# Patient Record
Sex: MALE | Race: WHITE | NOT HISPANIC OR LATINO | Employment: FULL TIME | URBAN - METROPOLITAN AREA
[De-identification: names, ages, dates, MRNs, and addresses within clinical notes are randomized per-mention and may not be internally consistent; named-entity substitution may affect disease eponyms.]

---

## 2017-08-14 DIAGNOSIS — H10.31 ACUTE CONJUNCTIVITIS OF RIGHT EYE, UNSPECIFIED ACUTE CONJUNCTIVITIS TYPE: Primary | ICD-10-CM

## 2017-08-14 PROBLEM — E66.01 MORBID OBESITY: Status: ACTIVE | Noted: 2017-08-14

## 2017-08-14 PROBLEM — F51.05 INSOMNIA RELATED TO ANOTHER MENTAL DISORDER: Status: ACTIVE | Noted: 2017-08-14

## 2017-08-14 RX ORDER — NEOMYCIN/POLYMYXIN B/HYDROCORT 3.5-10K-1
SUSPENSION, DROPS(FINAL DOSAGE FORM)(ML) OPHTHALMIC (EYE)
Qty: 5 ML | Refills: 0 | Status: SHIPPED | OUTPATIENT
Start: 2017-08-14 | End: 2017-09-06

## 2017-09-06 RX ORDER — ALPRAZOLAM 1 MG/1
1 TABLET ORAL 2 TIMES DAILY
COMMUNITY
Start: 2017-03-14 | End: 2017-09-12 | Stop reason: SDUPTHER

## 2017-09-06 RX ORDER — ESCITALOPRAM OXALATE 20 MG/1
1 TABLET ORAL DAILY
COMMUNITY
Start: 2017-03-14 | End: 2017-09-12 | Stop reason: SDUPTHER

## 2017-09-12 ENCOUNTER — OFFICE VISIT (OUTPATIENT)
Dept: FAMILY MEDICINE | Facility: CLINIC | Age: 47
End: 2017-09-12
Payer: COMMERCIAL

## 2017-09-12 VITALS
BODY MASS INDEX: 40.43 KG/M2 | SYSTOLIC BLOOD PRESSURE: 121 MMHG | WEIGHT: 315 LBS | DIASTOLIC BLOOD PRESSURE: 84 MMHG | HEIGHT: 74 IN | HEART RATE: 72 BPM

## 2017-09-12 DIAGNOSIS — F41.1 GENERALIZED ANXIETY DISORDER: ICD-10-CM

## 2017-09-12 DIAGNOSIS — F33.2 ENDOGENOUS DEPRESSION: ICD-10-CM

## 2017-09-12 DIAGNOSIS — Z23 INFLUENZA VACCINE ADMINISTERED: Primary | ICD-10-CM

## 2017-09-12 PROCEDURE — 3008F BODY MASS INDEX DOCD: CPT | Mod: ,,, | Performed by: INTERNAL MEDICINE

## 2017-09-12 PROCEDURE — 90471 IMMUNIZATION ADMIN: CPT | Mod: ,,, | Performed by: INTERNAL MEDICINE

## 2017-09-12 PROCEDURE — 99213 OFFICE O/P EST LOW 20 MIN: CPT | Mod: 25,,, | Performed by: INTERNAL MEDICINE

## 2017-09-12 PROCEDURE — 90686 IIV4 VACC NO PRSV 0.5 ML IM: CPT | Mod: ,,, | Performed by: INTERNAL MEDICINE

## 2017-09-12 RX ORDER — ESCITALOPRAM OXALATE 20 MG/1
20 TABLET ORAL DAILY
Qty: 30 TABLET | Refills: 5 | Status: SHIPPED | OUTPATIENT
Start: 2017-09-12 | End: 2018-03-12

## 2017-09-12 RX ORDER — ALPRAZOLAM 1 MG/1
1 TABLET ORAL 2 TIMES DAILY
Qty: 60 TABLET | Refills: 5 | Status: SHIPPED | OUTPATIENT
Start: 2017-09-12 | End: 2018-03-12 | Stop reason: SDUPTHER

## 2017-09-12 NOTE — PATIENT INSTRUCTIONS
Anxiety Reaction  Anxiety is the feeling we all get when we think something bad might happen. It is a normal response to stress and usually causes only a mild reaction. When anxiety becomes more severe, it can interfere with daily life. In some cases, you may not even be aware of what it is youre anxious about. There may also be a genetic link or it may be a learned behavior in the home.  Both psychological and physical triggers cause stress reaction. It's often a response to fear or emotional stress, real or imagined. This stress may come from home, family, work, or social relationships.  During an anxiety reaction, you may feel:  · Helpless  · Nervous  · Depressed  · Irritable  Your body may show signs of anxiety in many ways. You may experience:  · Dry mouth  · Shakiness  · Dizziness  · Weakness  · Trouble breathing  · Breathing fast (hyperventilating)  · Chest pressure  · Sweating  · Headache  · Nausea  · Diarrhea  · Tiredness  · Inability to sleep  · Sexual problems  Home care  · Try to locate the sources of stress in your life. They may not be obvious. These may include:  ¨ Daily hassles of life (traffic jams, missed appointments, car troubles, etc.)  ¨ Major life changes, both good (new baby, job promotion) and bad (loss of job, loss of loved one)  ¨ Overload: feeling that you have too many responsibilities and can't take care of all of them at once  ¨ Feeling helpless, feeling that your problems are beyond what youre able to solve  · Notice how your body reacts to stress. Learn to listen to your body signals. This will help you take action before the stress becomes severe.  · When you can, do something about the source of your stress. (Avoid hassles, limit the amount of change that happens in your life at one time and take a break when you feel overloaded).  · Unfortunately, many stressful situations can't be avoided. It is necessary to learn how to better manage stress. There are many proven methods  that will reduce your anxiety. These include simple things like exercise, good nutrition and adequate rest. Also, there are certain techniques that are helpful:  ¨ Relaxation  ¨ Breathing exercises  ¨ Visualization  ¨ Biofeedback  ¨ Meditation  For more information about this, consult your doctor or go to a local bookstore and review the many books and tapes available on this subject.  Follow-up care  If you feel that your anxiety is not responding to self-help measures, contact your doctor or make an appointment with a counselor. You may need short-term psychological counseling and temporary medicine to help you manage stress.  Call 911  Call your healthcare provider right away if any of these occur:  · Trouble breathing  · Confusion  · Drowsiness or trouble wakening  · Fainting or loss of consciousness  · Rapid heart rate  · Seizure  · New chest pain that becomes more severe, lasts longer, or spreads into your shoulder, arm, neck, jaw, or back  When to seek medical advice  Call your healthcare provider right away if any of these occur:  · Your symptoms get worse  · Severe headache not relieved by rest and mild pain reliever  Date Last Reviewed: 9/29/2015  © 7991-5251 [employer]. [address] [place] [address]. All rights reserved. This information is not intended as a substitute for professional medical care. Always follow your healthcare professional's instructions.

## 2017-09-12 NOTE — PROGRESS NOTES
Subjective:       Patient ID: Arun Ellis is a 47 y.o. male.    Chief Complaint: Hypertension; Anxiety; and Depression    Subjective:  Arun Ellis is an 47 y.o. male who presents for evaluation and treatment of depressive symptoms.   Onset approximately 2 yrs ago, stable since that time.   Current symptoms include insomnia.   Current treatment for depression:Medication  Sleep problems: Moderate    Early awakening:Moderate    Energy: Fair  Motivation: Fair  Concentration: Fair  Rumination/worrying: Moderate  Memory: Good  Tearfulness: Absent   Anxiety: Marked   Panic: Moderate   Overall Mood: Moderately improved   Hopelessness: Mild  Suicidal ideation: Absent   Other/Psychosocial Stressors: Patient's son's illness and need for liver transplant. He had prolonged hospitalization and repeat hospitalization. Patient also had 3 job changes in the last 2 years.  Family history positive for depression in the patient's unknown.   Previous treatment modalities employed include Medication.   Past episodes of depression: Unknown  Organic causes of depression present: None.          Anxiety   Presents for follow-up visit. Symptoms include excessive worry, insomnia and nervous/anxious behavior. Patient reports no chest pain, confusion, dizziness, irritability, malaise, obsessions, palpitations, restlessness or shortness of breath. The severity of symptoms is interfering with daily activities and moderate. The quality of sleep is non-restorative.           Past Medical History:   Diagnosis Date    Anxiety     Depression      Social History     Social History    Marital status:      Spouse name: N/A    Number of children: N/A    Years of education: N/A     Occupational History    Not on file.     Social History Main Topics    Smoking status: Current Some Day Smoker     Types: Pipe, Cigars    Smokeless tobacco: Never Used    Alcohol use Yes    Drug use: No    Sexual activity: Yes     Partners: Female  "    Other Topics Concern    Not on file     Social History Narrative    No narrative on file     Past Surgical History:   Procedure Laterality Date    APPENDECTOMY       Family History   Problem Relation Age of Onset    Heart disease Father        Review of Systems   Constitutional: Negative for activity change, appetite change, fatigue, irritability and unexpected weight change.   HENT: Negative for congestion, sneezing and trouble swallowing.    Eyes: Negative for pain and visual disturbance.   Respiratory: Negative for cough, chest tightness and shortness of breath.    Cardiovascular: Negative for chest pain, palpitations and leg swelling.   Gastrointestinal: Negative for abdominal distention, abdominal pain, blood in stool, constipation and diarrhea.   Endocrine: Negative for cold intolerance, heat intolerance, polydipsia, polyphagia and polyuria.   Genitourinary: Negative for dysuria, hematuria and scrotal swelling.   Musculoskeletal: Negative for arthralgias, back pain and gait problem.   Skin: Negative for pallor, rash and wound.   Allergic/Immunologic: Negative for environmental allergies, food allergies and immunocompromised state.   Neurological: Negative for dizziness, seizures, speech difficulty, light-headedness and numbness.   Hematological: Negative for adenopathy. Does not bruise/bleed easily.   Psychiatric/Behavioral: Negative for agitation, behavioral problems and confusion. The patient is nervous/anxious and has insomnia.         Underlying long-standing anxiety and depression.       Objective:       Vitals:    09/12/17 1033   BP: 121/84   Pulse: 72   Weight: (!) 159.7 kg (352 lb)   Height: 6' 2" (1.88 m)     Physical Exam   Constitutional: He is oriented to person, place, and time. He appears well-developed.   Significant obesity with a BMI of 45.   HENT:   Head: Normocephalic and atraumatic.   Nose: Nose normal.   Mouth/Throat: Oropharynx is clear and moist. No oropharyngeal exudate.   Eyes: " Conjunctivae and EOM are normal.   Neck: Normal range of motion. Neck supple. No JVD present. No tracheal deviation present. No thyromegaly present.   Cardiovascular: Normal rate, regular rhythm and normal heart sounds.  Exam reveals no gallop and no friction rub.    No murmur heard.  Pulmonary/Chest: Effort normal and breath sounds normal. No respiratory distress. He has no wheezes. He has no rales.   Abdominal: Soft. Bowel sounds are normal. He exhibits no distension. There is no tenderness.   Musculoskeletal: Normal range of motion.   Neurological: He is alert and oriented to person, place, and time. He has normal reflexes.   Skin: Skin is warm and dry.   Psychiatric: His behavior is normal. Thought content normal. His mood appears anxious. Depressed: somewhat anhedonic.   Nursing note and vitals reviewed.      Assessment:       1. Influenza vaccine administered    2. Endogenous depression    3. Generalized anxiety disorder         Plan:           Influenza vaccine administered  -     Influenza - Quadrivalent (3 years & older) (PF)    Endogenous depression    Generalized anxiety disorder    Patient's underlying depression and anxiety has been reviewed again. He has gone through a somewhat tumultuous face in his life with his sons serious illness and liver transplant. On a general he is unable to handle stress and anxiety at any level.    He has 3 job changes in the last couple of years.    He feels that his current job might be more stable and less aggravating or anxiety. His son who had a liver transplant as this seems to be coming back to the mainstream with a reasonable health.    Although he has been on benzodiazepines for several months and I suspect now there will be issues of long-term dependency. I'm hoping that with self introspection and realizes patient and also attempting techniques of medication/yoga he is able to wean himself off the medications. This will be a challenge however.     No suicidal  ideations.

## 2018-03-12 ENCOUNTER — OFFICE VISIT (OUTPATIENT)
Dept: FAMILY MEDICINE | Facility: CLINIC | Age: 48
End: 2018-03-12
Payer: COMMERCIAL

## 2018-03-12 VITALS
BODY MASS INDEX: 40.43 KG/M2 | DIASTOLIC BLOOD PRESSURE: 81 MMHG | HEART RATE: 71 BPM | HEIGHT: 74 IN | SYSTOLIC BLOOD PRESSURE: 120 MMHG | WEIGHT: 315 LBS

## 2018-03-12 DIAGNOSIS — F41.1 GENERALIZED ANXIETY DISORDER: Primary | ICD-10-CM

## 2018-03-12 DIAGNOSIS — Z13.220 SCREENING FOR LIPID DISORDERS: ICD-10-CM

## 2018-03-12 DIAGNOSIS — Z71.85 IMMUNIZATION COUNSELING: ICD-10-CM

## 2018-03-12 PROBLEM — Z78.9 NON-SMOKER: Status: ACTIVE | Noted: 2018-03-12

## 2018-03-12 PROCEDURE — 90471 IMMUNIZATION ADMIN: CPT | Mod: ,,, | Performed by: INTERNAL MEDICINE

## 2018-03-12 PROCEDURE — 90715 TDAP VACCINE 7 YRS/> IM: CPT | Mod: ,,, | Performed by: INTERNAL MEDICINE

## 2018-03-12 PROCEDURE — 99213 OFFICE O/P EST LOW 20 MIN: CPT | Mod: 25,,, | Performed by: INTERNAL MEDICINE

## 2018-03-12 RX ORDER — ALPRAZOLAM 1 MG/1
TABLET ORAL
Qty: 45 TABLET | Refills: 5 | Status: SHIPPED | OUTPATIENT
Start: 2018-03-12 | End: 2018-09-17 | Stop reason: SDUPTHER

## 2018-03-12 NOTE — PATIENT INSTRUCTIONS
Anxiety Reaction  Anxiety is the feeling we all get when we think something bad might happen. It is a normal response to stress and usually causes only a mild reaction. When anxiety becomes more severe, it can interfere with daily life. In some cases, you may not even be aware of what it is youre anxious about. There may also be a genetic link or it may be a learned behavior in the home.  Both psychological and physical triggers cause stress reaction. It's often a response to fear or emotional stress, real or imagined. This stress may come from home, family, work, or social relationships.  During an anxiety reaction, you may feel:  · Helpless  · Nervous  · Depressed  · Irritable  Your body may show signs of anxiety in many ways. You may experience:  · Dry mouth  · Shakiness  · Dizziness  · Weakness  · Trouble breathing  · Breathing fast (hyperventilating)  · Chest pressure  · Sweating  · Headache  · Nausea  · Diarrhea  · Tiredness  · Inability to sleep  · Sexual problems  Home care  · Try to locate the sources of stress in your life. They may not be obvious. These may include:  ¨ Daily hassles of life (traffic jams, missed appointments, car troubles, etc.)  ¨ Major life changes, both good (new baby, job promotion) and bad (loss of job, loss of loved one)  ¨ Overload: feeling that you have too many responsibilities and can't take care of all of them at once  ¨ Feeling helpless, feeling that your problems are beyond what youre able to solve  · Notice how your body reacts to stress. Learn to listen to your body signals. This will help you take action before the stress becomes severe.  · When you can, do something about the source of your stress. (Avoid hassles, limit the amount of change that happens in your life at one time and take a break when you feel overloaded).  · Unfortunately, many stressful situations can't be avoided. It is necessary to learn how to better manage stress. There are many proven methods  that will reduce your anxiety. These include simple things like exercise, good nutrition and adequate rest. Also, there are certain techniques that are helpful:  ¨ Relaxation  ¨ Breathing exercises  ¨ Visualization  ¨ Biofeedback  ¨ Meditation  For more information about this, consult your doctor or go to a local bookstore and review the many books and tapes available on this subject.  Follow-up care  If you feel that your anxiety is not responding to self-help measures, contact your doctor or make an appointment with a counselor. You may need short-term psychological counseling and temporary medicine to help you manage stress.  Call 911  Call your healthcare provider right away if any of these occur:  · Trouble breathing  · Confusion  · Drowsiness or trouble wakening  · Fainting or loss of consciousness  · Rapid heart rate  · Seizure  · New chest pain that becomes more severe, lasts longer, or spreads into your shoulder, arm, neck, jaw, or back  When to seek medical advice  Call your healthcare provider right away if any of these occur:  · Your symptoms get worse  · Severe headache not relieved by rest and mild pain reliever  Date Last Reviewed: 9/29/2015  © 7184-6648 [employer]. [address] [place] [address]. All rights reserved. This information is not intended as a substitute for professional medical care. Always follow your healthcare professional's instructions.        Your Bodys Response to Anxiety    Normal anxiety is part of the bodys natural defense system. It's an alert to a threat that is unknown, vague, or comes from your own internal fears. While youre in this state, your feelings can range from a vague sense of worry to physical sensations such as a pounding heartbeat. These feelings make you want to react to the threat. An anxiety response is normal in many situations. But when you have an anxiety disorder, the same response can occur at the wrong times.  Anxiety can be  "helpful  Normal anxiety is a signal from your brain that warns you of a threat and is a normal response to help you prevent something or decrease the bad effects of something you can't control. For example, anxiety is a normal response to situations that might damage your body, separate you from a loved one, or lose your job. The symptoms of anxiety can be physical and mental.  How does it feel?  At certain times, people with anxiety may have:  · Dizziness  · Muscle tension or pain  · Restlessness  · Sleeplessness  · Trouble concentrating  · Racing heartbeat  · Fast breathing  · Shaking or trembling  · Stomachache  · Diarrhea  · Loss of energy  · Sweating  · Cold, clammy hands  · Chest pain  · Dry mouth  Anxiety can also be a problem  Anxiety can become a problem when it is hard to control, occurs for months, and interferes with important parts of your life. With an anxiety disorder, your body has the response described above, but in inappropriate ways. The response a person has depends on the anxiety disorder he or she has. With some disorders, the anxiety is way out of proportion to the threat that triggers it. With others, anxiety may occur even when there isnt a clear threat or trigger.  Who does it affect?  Some people are more prone to persistent anxiety than others. It tends to run in families, and it affects more younger people than older people, and more women than men. But no age, race, or gender is immune to anxiety problems.  Anxiety can be treated  The good news is that the anxiety thats disrupting your life can be treated. Check with your healthcare provider and rule out any physical problems that may be causing the anxiety symptoms. If an anxiety disorder is diagnosed seek mental healthcare. This is an illness and it can respond to treatment. Most types of anxiety disorders will respond to "talk therapy" and medicines. Working with your doctor or other healthcare provider, you can develop skills to " help you cope with anxiety. You can also gain the perspective you need to overcome your fears. Note: Good sources of support or guidance can be found at your local hospital, mental health clinic, or an employee assistance program.  How to cope with anxiety  If anxiety is wearing you down, here are some things you can do to cope:  · Keep in mind that you cant control everything about a situation. Change what you can and let the rest take its course.  · Exercise--its a great way to relieve tension and help your body feel relaxed.  · Avoid caffeine and nicotine, which can make anxiety symptoms worse.  · Fight the temptation to turn to alcohol or unprescribed drugs for relief. They only make things worse in the long run.  · Educate yourself about anxiety disorders. Keep track of helpful online resources and books you can use during stressful periods.  · Try stress management techniques such as meditation.  · Consider online or in-person support groups.   Date Last Reviewed: 1/1/2017  © 2183-3703 [employer]. [address] [place] [address]. All rights reserved. This information is not intended as a substitute for professional medical care. Always follow your healthcare professional's instructions.        Treating Anxiety Disorders with Medicine  An anxiety disorder can make you feel nervous or apprehensive, even without a clear reason. In people age 65 and older, generalized anxiety disorder is one of the most commonly diagnosed anxiety disorders. Many times it occurs with depression. Certain anxiety disorders can cause intense feelings of fear or panic. You may even have physical symptoms such as a racing heartbeat, sweating, or dizziness. If you have these feelings, you dont have to suffer anymore. Treatment to help you overcome your fears will likely include therapy (also called counseling). Medicine may also be prescribed to help control your symptoms.    Medicines  Certain medicines may  be prescribed to help control your symptoms. So you may feel less anxious. You may also feel able to move forward with therapy. At first, medicines and dosages may need to be adjusted to find what works best for you. Try to be patient. Tell your healthcare provider how a medicine makes you feel. This way, you can work together to find the treatment thats best for you. Keep in mind that medicines can have side effects. Talk with your provider about any side effects that are bothering you. Changing the dose or type of medicine may help. Dont stop taking medicine on your own. That can cause symptoms to come back.  · Anti-anxiety medicine. This medicine eases symptoms and helps you relax. Your healthcare provider will explain when and how to use it. It may be prescribed for use before situations that make you anxious. You may also be told to take medicine on a regular schedule. Anti-anxiety medicine may make you feel a little sleepy or out of it. Dont drive a car or operate machinery while on this medicine, until you know how it affects you.  Caution  Never use alcohol or other drugs with anti-anxiety medicines. This could result in loss of muscular control, sedation, coma, or death. Also, use only the amount of medicine prescribed for you. If you think you may have taken too much, get emergency care right away.   · Antidepressant medicine. This kind of medicine is often used to treat anxiety, even if you arent depressed. An antidepressant helps balance out brain chemicals. This helps keep anxiety under control. This medicine is taken on a schedule. It takes a few weeks to start working. If you dont notice a change at first, you may just need more time. But if you dont notice results after the first few weeks, tell your provider.  Keep taking medicines as prescribed  Never change your dosage, share or use another person's medicine, or stop taking your medicines without talking to your healthcare provider first.  Keep the following in mind:  · Some medicines must be taken on a schedule. Make this part of your daily routine. For instance, always take your pill before brushing your teeth. A pillbox can help you remember if youve taken your medicine each day.  · Medicines are often taken for 6 to 12 months. Your healthcare provider will then evaluate whether you need to stay on them. Many people who have also had therapy may no longer need medicine to manage anxiety.  · You may need to stop taking medicine slowly to give your body time to adjust. When its time to stop, your healthcare provider will tell you more. Remember: Never stop taking your medicine without talking to your provider first.  · If symptoms return, you may need to start taking medicines again. This isnt your fault. Its just the nature of your anxiety disorder.  Special concerns  · Side effects. Medicines may cause side effects. Ask your healthcare provider or pharmacist what you can expect. They may have ideas for avoiding some side effects.  · Sexual problems. Some antidepressants can affect your desire for sex or your ability to have an orgasm. A change in dosage or medicine often solves the problem. If you have a sexual side effect that concerns you, tell your healthcare provider.  · Addiction. If youve never had a problem with drugs or alcohol, you may not have a problem with medicines used to treat anxiety disorders. But always discuss the medicines with your healthcare provider before taking them. If you have a history of addiction, you may not be able to use certain medicines used to treat anxiety disorders.  · Medicine interactions. Always check with your pharmacist before using any over-the-counter medicines, including herbal supplements.   Date Last Reviewed: 5/1/2017  © 6279-2594 [employer]. [address] [place] [address], [place] [address]. All rights reserved. This information is not intended as a substitute for professional medical  care. Always follow your healthcare professional's instructions.

## 2018-03-12 NOTE — PROGRESS NOTES
Subjective:       Patient ID: Arun Ellis is a 47 y.o. male.    Chief Complaint: Anxiety and Depression    Mr. Arun Ellis is a pleasant 47-year-old  male who comes for follow-up. He has underlying anxiety for several years. Approximately 2 years back his son had developed an acute liver failure for which he had to go through a liver transplant. While the transplant was successfully, the aftermath of the transplantation and the process of going back-and-forth to the hospital had rendered his anxiety worse at that point.    Over the last 2 years since several months he is gradually recuperating and is stabilizing. He does take 1 Xanax a day and the other at discretionary basis depending upon the stress.      Mr. Dias also had a couple of job changes in the interim. His mother-in-law was also ill.      Anxiety   Presents for follow-up visit. Symptoms include insomnia and nervous/anxious behavior. Patient reports no chest pain, compulsions, confusion, decreased concentration, depressed mood, dizziness, dry mouth, excessive worry, feeling of choking, hyperventilation, impotence, irritability, malaise, muscle tension, nausea, obsessions, palpitations, restlessness or shortness of breath. The quality of sleep is fair.           Past Medical History:   Diagnosis Date    Anxiety     Depression      Social History     Social History    Marital status:      Spouse name: N/A    Number of children: N/A    Years of education: N/A     Occupational History          [employer]     Social History Main Topics    Smoking status: Current Some Day Smoker     Types: Pipe, Cigars    Smokeless tobacco: Never Used    Alcohol use Yes    Drug use: No    Sexual activity: Yes     Partners: Female     Other Topics Concern    Not on file     Social History Narrative    No narrative on file     Past Surgical History:   Procedure Laterality Date    APPENDECTOMY       Family  "History   Problem Relation Age of Onset    Heart disease Father        Review of Systems   Constitutional: Negative for activity change, appetite change, fatigue, irritability and unexpected weight change.   HENT: Negative for congestion, sneezing and trouble swallowing.    Eyes: Negative for pain and visual disturbance.   Respiratory: Negative for cough, chest tightness and shortness of breath.         Patient's sleep apnea symptoms seem to be getting better better without mask.   Cardiovascular: Negative for chest pain, palpitations and leg swelling.   Gastrointestinal: Negative for abdominal distention, abdominal pain, blood in stool, constipation, diarrhea and nausea.   Endocrine: Negative for cold intolerance, heat intolerance, polydipsia, polyphagia and polyuria.   Genitourinary: Negative for dysuria, hematuria and impotence.   Musculoskeletal: Negative for back pain.   Skin: Negative for pallor, rash and wound.   Allergic/Immunologic: Negative for environmental allergies, food allergies and immunocompromised state.   Neurological: Negative for dizziness, seizures, speech difficulty, light-headedness and numbness.   Hematological: Negative for adenopathy. Does not bruise/bleed easily.   Psychiatric/Behavioral: Negative for agitation, behavioral problems, confusion and decreased concentration. The patient is nervous/anxious and has insomnia.         Underlying long-standing anxiety and depression.       Objective:       Vitals:    03/12/18 1558   BP: 120/81   Pulse: 71   Weight: (!) 156 kg (344 lb)   Height: 6' 2" (1.88 m)     Physical Exam   Constitutional: He is oriented to person, place, and time. He appears well-developed.   Significant obesity with a BMI of 45.   HENT:   Head: Normocephalic and atraumatic.   Nose: Nose normal.   Mouth/Throat: Oropharynx is clear and moist. No oropharyngeal exudate.   Eyes: Conjunctivae and EOM are normal.   Neck: Normal range of motion. Neck supple. No JVD present. No " tracheal deviation present. No thyromegaly present.   Cardiovascular: Normal rate, regular rhythm and normal heart sounds.  Exam reveals no gallop and no friction rub.    No murmur heard.  Pulmonary/Chest: Effort normal and breath sounds normal. No respiratory distress. He has no wheezes. He has no rales.   Abdominal: Soft. Bowel sounds are normal. He exhibits no distension. There is no tenderness.   Musculoskeletal: Normal range of motion.   Neurological: He is alert and oriented to person, place, and time. He has normal reflexes.   Skin: Skin is warm and dry.   Psychiatric: His behavior is normal. Thought content normal. His mood appears anxious. Depressed: somewhat anhedonic.   Nursing note and vitals reviewed.      Assessment:       1. Generalized anxiety disorder    2. Immunization counseling    3. Screening for lipid disorders         Plan:           Generalized anxiety disorder  -     ALPRAZolam (XANAX) 1 MG tablet; Usually no more than once a day and rarely twice a day as needed.  Dispense: 45 tablet; Refill: 5    Immunization counseling  -     (In Office Administered) Tdap Vaccine    Screening for lipid disorders  -     Lipid panel; Future; Expected date: 03/12/2018    He is also due for lipid panel checkup. He will also be due for DTaP vaccination.    All said and done, I will see Mr. Ellis back in 6 months time for annual physical.    As for his anxiety is concerned, hopefully with his sons gradual improvement and with streamlining and stabilizing of his general life-he will be better able to cope up with usual anxieties. Also advised him to consider breathing and meditation exercises whenever he feels stressed which will help him cope up with stressful situations.

## 2018-09-17 ENCOUNTER — OFFICE VISIT (OUTPATIENT)
Dept: FAMILY MEDICINE | Facility: CLINIC | Age: 48
End: 2018-09-17
Payer: COMMERCIAL

## 2018-09-17 VITALS
SYSTOLIC BLOOD PRESSURE: 132 MMHG | HEART RATE: 69 BPM | HEIGHT: 74 IN | BODY MASS INDEX: 40.43 KG/M2 | WEIGHT: 315 LBS | DIASTOLIC BLOOD PRESSURE: 84 MMHG

## 2018-09-17 DIAGNOSIS — F41.1 GENERALIZED ANXIETY DISORDER: ICD-10-CM

## 2018-09-17 DIAGNOSIS — Z23 INFLUENZA VACCINE ADMINISTERED: Primary | ICD-10-CM

## 2018-09-17 DIAGNOSIS — Z12.11 SCREENING FOR COLON CANCER: ICD-10-CM

## 2018-09-17 DIAGNOSIS — Z13.220 SCREENING FOR HYPERLIPIDEMIA: ICD-10-CM

## 2018-09-17 PROCEDURE — 99213 OFFICE O/P EST LOW 20 MIN: CPT | Mod: 25,,, | Performed by: INTERNAL MEDICINE

## 2018-09-17 PROCEDURE — 90471 IMMUNIZATION ADMIN: CPT | Mod: ,,, | Performed by: INTERNAL MEDICINE

## 2018-09-17 PROCEDURE — 90686 IIV4 VACC NO PRSV 0.5 ML IM: CPT | Mod: ,,, | Performed by: INTERNAL MEDICINE

## 2018-09-17 PROCEDURE — 3008F BODY MASS INDEX DOCD: CPT | Mod: ,,, | Performed by: INTERNAL MEDICINE

## 2018-09-17 RX ORDER — ALPRAZOLAM 1 MG/1
TABLET ORAL
Qty: 45 TABLET | Refills: 5 | Status: SHIPPED | OUTPATIENT
Start: 2018-09-17 | End: 2019-03-18 | Stop reason: SDUPTHER

## 2018-09-17 NOTE — PROGRESS NOTES
Subjective:       Patient ID: Arun Ellis is a 48 y.o. male.    Chief Complaint: Depression (refills ) and Anxiety    Depression Patient presents with the following symptoms: nervousness/anxiety.  Patient is not experiencing: confusion, decreased concentration, palpitations and shortness of breath.    Anxiety   Symptoms include nervous/anxious behavior. Patient reports no chest pain, confusion, decreased concentration, dizziness, nausea, palpitations or shortness of breath.       Patient has a long-standing history of anxiety. His coping skills are not very effective. Any change in his job situation, home situation aggravates his anxiety.    His anxiety seems to have got worse when his son who is age 6 years, critically ill and needed a liver transplant. At that time he was treated with lorazepam and Lexapro. Lexapro did not seem to help him too much.    At this point his son is doing fairly well after liver transplant and is beginning to reconsult with a stable job and stability at home.    However his anxiety episodes continue.    Past Medical History:   Diagnosis Date    Anxiety     Depression      Social History     Socioeconomic History    Marital status:      Spouse name: Not on file    Number of children: Not on file    Years of education: Not on file    Highest education level: Not on file   Social Needs    Financial resource strain: Not on file    Food insecurity - worry: Not on file    Food insecurity - inability: Not on file    Transportation needs - medical: Not on file    Transportation needs - non-medical: Not on file   Occupational History    Occupation:      Comment: [employer]   Tobacco Use    Smoking status: Current Some Day Smoker     Types: Pipe, Cigars    Smokeless tobacco: Never Used   Substance and Sexual Activity    Alcohol use: Yes    Drug use: No    Sexual activity: Yes     Partners: Female   Other Topics Concern    Not on file  "  Social History Narrative    Not on file     Past Surgical History:   Procedure Laterality Date    APPENDECTOMY       Family History   Problem Relation Age of Onset    Heart disease Father        Review of Systems   Constitutional: Negative for activity change, appetite change, fatigue and unexpected weight change.   HENT: Negative for congestion, sneezing and trouble swallowing.    Eyes: Negative for pain and visual disturbance.   Respiratory: Negative for cough, chest tightness and shortness of breath.         Patient's sleep apnea symptoms seem to be getting better better without mask.   Cardiovascular: Negative for chest pain, palpitations and leg swelling.   Gastrointestinal: Negative for abdominal distention, abdominal pain, blood in stool, constipation, diarrhea and nausea.   Endocrine: Negative for cold intolerance, heat intolerance, polydipsia, polyphagia and polyuria.   Genitourinary: Negative for dysuria and hematuria.   Musculoskeletal: Negative for back pain.   Skin: Negative for pallor, rash and wound.   Allergic/Immunologic: Negative for environmental allergies, food allergies and immunocompromised state.   Neurological: Negative for dizziness, seizures, speech difficulty, light-headedness and numbness.   Hematological: Negative for adenopathy. Does not bruise/bleed easily.   Psychiatric/Behavioral: Positive for depression. Negative for agitation, behavioral problems, confusion and decreased concentration. The patient is nervous/anxious.         Underlying long-standing anxiety and depression.         Objective:      Blood pressure 132/84, pulse 69, height 6' 2" (1.88 m), weight (!) 155.6 kg (343 lb). Body mass index is 44.04 kg/m².  Physical Exam   Constitutional: He is oriented to person, place, and time. He appears well-developed.   Significant obesity with a BMI of 44   HENT:   Head: Normocephalic and atraumatic.   Nose: Nose normal.   Mouth/Throat: Oropharynx is clear and moist. No " oropharyngeal exudate.   Eyes: Conjunctivae and EOM are normal.   Neck: Normal range of motion. Neck supple. No JVD present. No tracheal deviation present. No thyromegaly present.   Cardiovascular: Normal rate, regular rhythm and normal heart sounds. Exam reveals no gallop and no friction rub.   No murmur heard.  Pulmonary/Chest: Effort normal and breath sounds normal. No respiratory distress. He has no wheezes. He has no rales.   Abdominal: Soft. Bowel sounds are normal. He exhibits no distension. There is no tenderness.   Neurological: He is alert and oriented to person, place, and time. He has normal reflexes.   Skin: Skin is warm and dry.   Psychiatric: His behavior is normal. Thought content normal. His mood appears anxious.   Nursing note and vitals reviewed.        Assessment:       1. Influenza vaccine administered    2. Generalized anxiety disorder    3. Screening for hyperlipidemia           No visits with results within 3 Month(s) from this visit.   Latest known visit with results is:   No results found for any previous visit.         Plan:           Influenza vaccine administered  -     Influenza - Quadrivalent (3 years & older) (PF)    Generalized anxiety disorder  -     ALPRAZolam (XANAX) 1 MG tablet; Usually no more than once a day and rarely twice a day as needed.  Dispense: 45 tablet; Refill: 5    Screening for hyperlipidemia  -     Lipid panel; Future; Expected date: 09/17/2018      New prescription for Xanax will be given. Patient has been advised to minimize use of this medication for long-term use. Medication, self realization and mindfulness will help mitigate the daily stresses of life.    Also incorporate walking, exercise.    Patient will be updated on influenza vaccination. He is long overdue for lipid panel screening.    Now that he has turned 48, preventive care issues like colonoscopy and prostate screening will be addressed subsequently.      Current Outpatient Medications:      ALPRAZolam (XANAX) 1 MG tablet, Usually no more than once a day and rarely twice a day as needed., Disp: 45 tablet, Rfl: 5

## 2018-09-17 NOTE — PATIENT INSTRUCTIONS
Anxiety Reaction  Anxiety is the feeling we all get when we think something bad might happen. It is a normal response to stress and usually causes only a mild reaction. When anxiety becomes more severe, it can interfere with daily life. In some cases, you may not even be aware of what it is youre anxious about. There may also be a genetic link or it may be a learned behavior in the home.  Both psychological and physical triggers cause stress reaction. It's often a response to fear or emotional stress, real or imagined. This stress may come from home, family, work, or social relationships.  During an anxiety reaction, you may feel:  · Helpless  · Nervous  · Depressed  · Irritable  Your body may show signs of anxiety in many ways. You may experience:  · Dry mouth  · Shakiness  · Dizziness  · Weakness  · Trouble breathing  · Breathing fast (hyperventilating)  · Chest pressure  · Sweating  · Headache  · Nausea  · Diarrhea  · Tiredness  · Inability to sleep  · Sexual problems  Home care  · Try to locate the sources of stress in your life. They may not be obvious. These may include:  ¨ Daily hassles of life (traffic jams, missed appointments, car troubles, etc.)  ¨ Major life changes, both good (new baby, job promotion) and bad (loss of job, loss of loved one)  ¨ Overload: feeling that you have too many responsibilities and can't take care of all of them at once  ¨ Feeling helpless, feeling that your problems are beyond what youre able to solve  · Notice how your body reacts to stress. Learn to listen to your body signals. This will help you take action before the stress becomes severe.  · When you can, do something about the source of your stress. (Avoid hassles, limit the amount of change that happens in your life at one time and take a break when you feel overloaded).  · Unfortunately, many stressful situations can't be avoided. It is necessary to learn how to better manage stress. There are many proven methods  that will reduce your anxiety. These include simple things like exercise, good nutrition and adequate rest. Also, there are certain techniques that are helpful:  ¨ Relaxation  ¨ Breathing exercises  ¨ Visualization  ¨ Biofeedback  ¨ Meditation  For more information about this, consult your doctor or go to a local bookstore and review the many books and tapes available on this subject.  Follow-up care  If you feel that your anxiety is not responding to self-help measures, contact your doctor or make an appointment with a counselor. You may need short-term psychological counseling and temporary medicine to help you manage stress.  Call 911  Call your healthcare provider right away if any of these occur:  · Trouble breathing  · Confusion  · Drowsiness or trouble wakening  · Fainting or loss of consciousness  · Rapid heart rate  · Seizure  · New chest pain that becomes more severe, lasts longer, or spreads into your shoulder, arm, neck, jaw, or back  When to seek medical advice  Call your healthcare provider right away if any of these occur:  · Your symptoms get worse  · Severe headache not relieved by rest and mild pain reliever  Date Last Reviewed: 9/29/2015  © 4854-8015 [employer]. [address] [place] [address]. All rights reserved. This information is not intended as a substitute for professional medical care. Always follow your healthcare professional's instructions.        Your Bodys Response to Anxiety    Normal anxiety is part of the bodys natural defense system. It's an alert to a threat that is unknown, vague, or comes from your own internal fears. While youre in this state, your feelings can range from a vague sense of worry to physical sensations such as a pounding heartbeat. These feelings make you want to react to the threat. An anxiety response is normal in many situations. But when you have an anxiety disorder, the same response can occur at the wrong times.  Anxiety can be  "helpful  Normal anxiety is a signal from your brain that warns you of a threat and is a normal response to help you prevent something or decrease the bad effects of something you can't control. For example, anxiety is a normal response to situations that might damage your body, separate you from a loved one, or lose your job. The symptoms of anxiety can be physical and mental.  How does it feel?  At certain times, people with anxiety may have:  · Dizziness  · Muscle tension or pain  · Restlessness  · Sleeplessness  · Trouble concentrating  · Racing heartbeat  · Fast breathing  · Shaking or trembling  · Stomachache  · Diarrhea  · Loss of energy  · Sweating  · Cold, clammy hands  · Chest pain  · Dry mouth  Anxiety can also be a problem  Anxiety can become a problem when it is hard to control, occurs for months, and interferes with important parts of your life. With an anxiety disorder, your body has the response described above, but in inappropriate ways. The response a person has depends on the anxiety disorder he or she has. With some disorders, the anxiety is way out of proportion to the threat that triggers it. With others, anxiety may occur even when there isnt a clear threat or trigger.  Who does it affect?  Some people are more prone to persistent anxiety than others. It tends to run in families, and it affects more younger people than older people, and more women than men. But no age, race, or gender is immune to anxiety problems.  Anxiety can be treated  The good news is that the anxiety thats disrupting your life can be treated. Check with your healthcare provider and rule out any physical problems that may be causing the anxiety symptoms. If an anxiety disorder is diagnosed seek mental healthcare. This is an illness and it can respond to treatment. Most types of anxiety disorders will respond to "talk therapy" and medicines. Working with your doctor or other healthcare provider, you can develop skills to " help you cope with anxiety. You can also gain the perspective you need to overcome your fears. Note: Good sources of support or guidance can be found at your local hospital, mental health clinic, or an employee assistance program.  How to cope with anxiety  If anxiety is wearing you down, here are some things you can do to cope:  · Keep in mind that you cant control everything about a situation. Change what you can and let the rest take its course.  · Exercise--its a great way to relieve tension and help your body feel relaxed.  · Avoid caffeine and nicotine, which can make anxiety symptoms worse.  · Fight the temptation to turn to alcohol or unprescribed drugs for relief. They only make things worse in the long run.  · Educate yourself about anxiety disorders. Keep track of helpful online resources and books you can use during stressful periods.  · Try stress management techniques such as meditation.  · Consider online or in-person support groups.   Date Last Reviewed: 1/1/2017  © 4714-0230 [employer]. [address] [place] [address]. All rights reserved. This information is not intended as a substitute for professional medical care. Always follow your healthcare professional's instructions.        Understanding Generalized Anxiety Disorder (RIAN)  Anxiety can fill you with worry and fear. Sometimes anxiety is healthy. It alerts you to a potential threat and gets you to respond and take action. But for some people, anxiety gets so bad it causes problems in daily life. If you find yourself in a constant state of anxiety, you may have an anxiety disorder called generalized anxiety disorder (RIAN). Speak with your healthcare provider or mental health professional to learn more. He or she can help.     What is generalized anxiety disorder?  With RIAN, you might worry about money, your family and friends, work, or the world in general. You might not even be sure what you're anxious about. But  whatever it is, you have an intense fear that the worst will happen. These feelings never really go away. In people age 65 and older, RIAN is one of the most commonly diagnosed anxiety disorders.  Many times it occurs with depression. This constant worry affects your quality of life and makes it hard to function. RIAN can cause physical symptoms, too.  What are common symptoms of generalized anxiety disorder?  People with RIAN often think they have a physical illness. The disorder can cause symptoms, such as:  · Muscle tension, especially in the neck and shoulders  · Nausea and stomach problems  · Frequent headaches  · Feeling lightheaded  · Restlessness, trouble sleeping  · Feeling irritable and on edge all the time  How can generalized anxiety disorder be treated?  RIAN can be treated with medicine or therapy (also called counseling), or both. Medicine helps to reduce symptoms, so you can continue with your daily routine. Therapy helps you understand the cause of your anxiety and learn how to manage it. Both forms of treatment help you deal with problems that anxiety causes in your life. This helps you to be healthier and happier.  Date Last Reviewed: 5/1/2017 © 2000-2017 [employer]. [address] [place] [address], [place] [address]. All rights reserved. This information is not intended as a substitute for professional medical care. Always follow your healthcare professional's instructions.

## 2018-12-17 PROBLEM — Z13.220 SCREENING FOR HYPERLIPIDEMIA: Status: RESOLVED | Noted: 2018-09-17 | Resolved: 2018-12-17

## 2019-02-25 ENCOUNTER — OFFICE VISIT (OUTPATIENT)
Dept: FAMILY MEDICINE | Facility: CLINIC | Age: 49
End: 2019-02-25
Payer: COMMERCIAL

## 2019-02-25 ENCOUNTER — PATIENT MESSAGE (OUTPATIENT)
Dept: FAMILY MEDICINE | Facility: CLINIC | Age: 49
End: 2019-02-25

## 2019-02-25 VITALS
TEMPERATURE: 99 F | HEIGHT: 74 IN | SYSTOLIC BLOOD PRESSURE: 132 MMHG | HEART RATE: 70 BPM | DIASTOLIC BLOOD PRESSURE: 82 MMHG | BODY MASS INDEX: 40.43 KG/M2 | WEIGHT: 315 LBS

## 2019-02-25 DIAGNOSIS — B30.9 VIRAL CONJUNCTIVITIS OF BOTH EYES: Primary | ICD-10-CM

## 2019-02-25 PROCEDURE — 99212 OFFICE O/P EST SF 10 MIN: CPT | Mod: ,,, | Performed by: INTERNAL MEDICINE

## 2019-02-25 PROCEDURE — 99212 PR OFFICE/OUTPT VISIT, EST, LEVL II, 10-19 MIN: ICD-10-PCS | Mod: ,,, | Performed by: INTERNAL MEDICINE

## 2019-02-25 PROCEDURE — 3008F BODY MASS INDEX DOCD: CPT | Mod: ,,, | Performed by: INTERNAL MEDICINE

## 2019-02-25 PROCEDURE — 3008F PR BODY MASS INDEX (BMI) DOCUMENTED: ICD-10-PCS | Mod: ,,, | Performed by: INTERNAL MEDICINE

## 2019-02-25 RX ORDER — SULFACETAMIDE SODIUM 100 MG/ML
1 SOLUTION/ DROPS OPHTHALMIC 4 TIMES DAILY
Qty: 5 ML | Refills: 0 | Status: SHIPPED | OUTPATIENT
Start: 2019-02-25 | End: 2019-05-20

## 2019-02-25 NOTE — PROGRESS NOTES
Subjective:       Patient ID: Arun Ellis is a 48 y.o. male.    Chief Complaint: Conjunctivitis and Headache    Conjunctivitis   This is a new problem. The current episode started in the past 7 days. The problem occurs constantly. The problem has been unchanged. Associated symptoms include headaches. Pertinent negatives include no abdominal pain, chest pain, congestion, coughing, fatigue, nausea, numbness or rash.   Headache    This is a new problem. The current episode started in the past 7 days. Associated symptoms include eye redness. Pertinent negatives include no abdominal pain, back pain, coughing, dizziness, eye pain, nausea, numbness or seizures. He has tried acetaminophen for the symptoms. The treatment provided mild relief.       Past Medical History:   Diagnosis Date    Anxiety     Depression      Social History     Socioeconomic History    Marital status:      Spouse name: Nila Whitman     Number of children: 2    Years of education: Not on file    Highest education level: Not on file   Social Needs    Financial resource strain: Not on file    Food insecurity - worry: Not on file    Food insecurity - inability: Not on file    Transportation needs - medical: Not on file    Transportation needs - non-medical: Not on file   Occupational History    Occupation:      Comment: [employer]   Tobacco Use    Smoking status: Never Smoker    Smokeless tobacco: Never Used   Substance and Sexual Activity    Alcohol use: Yes    Drug use: No    Sexual activity: Yes     Partners: Female   Other Topics Concern    Not on file   Social History Narrative    Not on file     Past Surgical History:   Procedure Laterality Date    APPENDECTOMY       Family History   Problem Relation Age of Onset    Heart disease Father        Review of Systems   Constitutional: Negative for activity change, appetite change, fatigue and unexpected weight change.   HENT: Negative  "for congestion, sneezing and trouble swallowing.    Eyes: Positive for redness. Negative for pain and visual disturbance.   Respiratory: Negative for cough, chest tightness and shortness of breath.         Patient's sleep apnea symptoms seem to be getting better better without mask.   Cardiovascular: Negative for chest pain, palpitations and leg swelling.   Gastrointestinal: Negative for abdominal distention, abdominal pain, blood in stool, constipation, diarrhea and nausea.   Endocrine: Negative for cold intolerance, heat intolerance, polydipsia, polyphagia and polyuria.   Genitourinary: Negative for dysuria and hematuria.   Musculoskeletal: Negative for back pain.   Skin: Negative for pallor, rash and wound.   Allergic/Immunologic: Negative for environmental allergies, food allergies and immunocompromised state.   Neurological: Positive for headaches. Negative for dizziness, seizures, speech difficulty, light-headedness and numbness.   Hematological: Negative for adenopathy. Does not bruise/bleed easily.   Psychiatric/Behavioral: Negative for agitation, behavioral problems, confusion and decreased concentration. The patient is nervous/anxious.         Underlying long-standing anxiety and depression.         Objective:      Blood pressure 132/82, pulse 70, temperature 98.6 °F (37 °C), height 6' 2" (1.88 m), weight (!) 156.9 kg (346 lb). Body mass index is 44.42 kg/m².  Physical Exam   Constitutional: He is oriented to person, place, and time. He appears well-developed.   Significant obesity with a BMI of 44   HENT:   Head: Normocephalic and atraumatic.   Nose: Nose normal.   Mouth/Throat: Oropharynx is clear and moist. No oropharyngeal exudate.   Eyes: Conjunctivae and EOM are normal.   Neck: Normal range of motion. Neck supple. No JVD present. No tracheal deviation present. No thyromegaly present.   Cardiovascular: Normal rate, regular rhythm and normal heart sounds. Exam reveals no gallop and no friction rub. "   No murmur heard.  Pulmonary/Chest: Effort normal and breath sounds normal. No respiratory distress. He has no wheezes. He has no rales.   Abdominal: Soft. Bowel sounds are normal. He exhibits no distension. There is no tenderness.   Neurological: He is alert and oriented to person, place, and time. He has normal reflexes.   Psychiatric: His behavior is normal. Thought content normal. His mood appears anxious.   Nursing note and vitals reviewed.        Assessment:       1. Viral conjunctivitis of both eyes           No visits with results within 3 Month(s) from this visit.   Latest known visit with results is:   No results found for any previous visit.         Plan:           Viral conjunctivitis of both eyes  -     sulfacetamide sodium 10% (BLEPH-10) 10 % ophthalmic solution; Place 1 drop into both eyes 4 (four) times daily.  Dispense: 5 mL; Refill: 0      Precautions off exposure and contact have been discussed. Keep eyes with warm water twice a day.    Sulfacetamide for secondary prevention of bacterial infection. Tylenol or Motrin for headaches. Headaches are probably related to viral infection. No fevers.    Let us know by Wednesday if eyes no better in which case we'll consider ophthalmology evaluation.    Keep  regular appointment as per schedule.      Current Outpatient Medications:     ALPRAZolam (XANAX) 1 MG tablet, Usually no more than once a day and rarely twice a day as needed., Disp: 45 tablet, Rfl: 5    sulfacetamide sodium 10% (BLEPH-10) 10 % ophthalmic solution, Place 1 drop into both eyes 4 (four) times daily., Disp: 5 mL, Rfl: 0

## 2019-02-25 NOTE — PATIENT INSTRUCTIONS
Conjunctivitis, Viral    Viral conjunctivitis (sometimes called pink eye) is a common infection of the eye. It is very contagious. Touching the infected eye, then touching another person passes this infection. It can also be spread from one eye to the other in this same way. The most common symptoms include redness, discharge from the eye, swollen eyelids, and a gritty or scratchy feeling in the eye.  This condition will take about 7 to 10 days to go away. Artificial tears (available without a prescription) are often recommended to moisten and clean the eyes. Antibiotic eye drops often are not recommended because they will not kill the virus. But sometimes they may be prescribed by eye doctors. This is to prevent a second, bacterial infection.  Home care  · Apply a towel soaked in cool water to the affected eye 3 to 4 times a day (just before applying medicine to the eye).  · It is common to have mucus drainage during the night, causing the eyelids to become crusted by morning. Use a warm, wet cloth to wipe this away.  · Launder cloths used to clean the eye after one use. Do not reuse them.  · If antibiotic medicines are prescribed, take them exactly as directed. Do not stop taking them until you are told to.  · You may use acetaminophen or ibuprofen to control pain, unless another medicine was prescribed. (Note:If you have chronic liver or kidney disease, or if you have ever had a stomach ulcer or gastrointestinal bleeding, talk with your healthcare provider before using these medicines.) Aspirin should never be used in anyone under 18 years of age who is ill with a fever. It may cause severe liver damage.  · Wash your hands before and after touching the affected eye. This helps to prevent spreading the infection to your other eye and to others.  · The infected person should avoid sharing towels, washcloths, and bedding with others. This is to prevent spreading the infection.  · This illness is contagious during  the first week. Children with this illness should be kept out of day care and school until the redness clears.  Follow-up care  Follow up with your healthcare provider, or as advised.  When to seek medical advice  Call your healthcare provider right away if any of these occur:  · Worsening vision  · Increasing pain in the eye  · Increasing swelling or redness of the eyelid  · Redness spreading to the face around the eye  · Large amount of green or yellow drainage from the eye  · Severe itching in or around the eye  · Fever of 100.4°F (38°C) or higher  Date Last Reviewed: 6/15/2015  © 9155-0847 [employer]. [address] [place] [address], [place] [address]. All rights reserved. This information is not intended as a substitute for professional medical care. Always follow your healthcare professional's instructions.

## 2019-02-27 ENCOUNTER — PATIENT MESSAGE (OUTPATIENT)
Dept: FAMILY MEDICINE | Facility: CLINIC | Age: 49
End: 2019-02-27

## 2019-03-18 ENCOUNTER — OFFICE VISIT (OUTPATIENT)
Dept: FAMILY MEDICINE | Facility: CLINIC | Age: 49
End: 2019-03-18
Payer: COMMERCIAL

## 2019-03-18 VITALS
DIASTOLIC BLOOD PRESSURE: 81 MMHG | HEART RATE: 72 BPM | WEIGHT: 315 LBS | SYSTOLIC BLOOD PRESSURE: 131 MMHG | HEIGHT: 74 IN | BODY MASS INDEX: 40.43 KG/M2

## 2019-03-18 DIAGNOSIS — Z12.11 SCREEN FOR COLON CANCER: ICD-10-CM

## 2019-03-18 DIAGNOSIS — F41.1 GENERALIZED ANXIETY DISORDER: Primary | ICD-10-CM

## 2019-03-18 PROCEDURE — 99213 PR OFFICE/OUTPT VISIT, EST, LEVL III, 20-29 MIN: ICD-10-PCS | Mod: ,,, | Performed by: INTERNAL MEDICINE

## 2019-03-18 PROCEDURE — 3008F PR BODY MASS INDEX (BMI) DOCUMENTED: ICD-10-PCS | Mod: ,,, | Performed by: INTERNAL MEDICINE

## 2019-03-18 PROCEDURE — 99213 OFFICE O/P EST LOW 20 MIN: CPT | Mod: ,,, | Performed by: INTERNAL MEDICINE

## 2019-03-18 PROCEDURE — 3008F BODY MASS INDEX DOCD: CPT | Mod: ,,, | Performed by: INTERNAL MEDICINE

## 2019-03-18 RX ORDER — ALPRAZOLAM 1 MG/1
TABLET ORAL
Qty: 15 TABLET | Refills: 3 | Status: SHIPPED | OUTPATIENT
Start: 2019-03-18 | End: 2019-07-01 | Stop reason: SDUPTHER

## 2019-03-18 NOTE — PATIENT INSTRUCTIONS
Anxiety Reaction  Anxiety is the feeling we all get when we think something bad might happen. It is a normal response to stress and usually causes only a mild reaction. When anxiety becomes more severe, it can interfere with daily life. In some cases, you may not even be aware of what it is youre anxious about. There may also be a genetic link or it may be a learned behavior in the home.  Both psychological and physical triggers cause stress reaction. It's often a response to fear or emotional stress, real or imagined. This stress may come from home, family, work, or social relationships.  During an anxiety reaction, you may feel:  · Helpless  · Nervous  · Depressed  · Irritable  Your body may show signs of anxiety in many ways. You may experience:  · Dry mouth  · Shakiness  · Dizziness  · Weakness  · Trouble breathing  · Breathing fast (hyperventilating)  · Chest pressure  · Sweating  · Headache  · Nausea  · Diarrhea  · Tiredness  · Inability to sleep  · Sexual problems  Home care  · Try to locate the sources of stress in your life. They may not be obvious. These may include:  ¨ Daily hassles of life (traffic jams, missed appointments, car troubles, etc.)  ¨ Major life changes, both good (new baby, job promotion) and bad (loss of job, loss of loved one)  ¨ Overload: feeling that you have too many responsibilities and can't take care of all of them at once  ¨ Feeling helpless, feeling that your problems are beyond what youre able to solve  · Notice how your body reacts to stress. Learn to listen to your body signals. This will help you take action before the stress becomes severe.  · When you can, do something about the source of your stress. (Avoid hassles, limit the amount of change that happens in your life at one time and take a break when you feel overloaded).  · Unfortunately, many stressful situations can't be avoided. It is necessary to learn how to better manage stress. There are many proven methods  that will reduce your anxiety. These include simple things like exercise, good nutrition and adequate rest. Also, there are certain techniques that are helpful:  ¨ Relaxation  ¨ Breathing exercises  ¨ Visualization  ¨ Biofeedback  ¨ Meditation  For more information about this, consult your doctor or go to a local bookstore and review the many books and tapes available on this subject.  Follow-up care  If you feel that your anxiety is not responding to self-help measures, contact your doctor or make an appointment with a counselor. You may need short-term psychological counseling and temporary medicine to help you manage stress.  Call 911  Call your healthcare provider right away if any of these occur:  · Trouble breathing  · Confusion  · Drowsiness or trouble wakening  · Fainting or loss of consciousness  · Rapid heart rate  · Seizure  · New chest pain that becomes more severe, lasts longer, or spreads into your shoulder, arm, neck, jaw, or back  When to seek medical advice  Call your healthcare provider right away if any of these occur:  · Your symptoms get worse  · Severe headache not relieved by rest and mild pain reliever  Date Last Reviewed: 9/29/2015  © 7365-5341 [employer]. [address] [place] [id number]. All rights reserved. This information is not intended as a substitute for professional medical care. Always follow your healthcare professional's instructions.

## 2019-03-18 NOTE — PROGRESS NOTES
Subjective:       Patient ID: Arun Ellis is a 48 y.o. male.    Chief Complaint: Anxiety and Depression    Mr. Arun Ellis is a 48-year-old  male who comes for follow-up. History of anxiety disorder and somewhat poor sleep has been noted. This has been going on for several years but seem to have got worse when his younger child Nikunj was diagnosed with acute hepatic failure and had to go through hepatic transplantation with several post surgical decompensations needing hospitalizations. This led to a chronic stage of anxiety. There were some family issues and interpersonal relationship with his wife also.    Family history again has been reviewed for some degree of anxiety and stress and father and perhaps one other sister. Patient does not abuse drugs or alcohol. Nonsmoker. He does have a stable and a good job at this point but previously she had to change couple of jobs which further worsened his anxiety.    He has tried Lexapro in past which might have had been to some degree with anxiety but did not help him with sleep at night. He tosses and turns and ovaries worried or anxious about work or any other issue.    He is to take Xanax up to one half tablets per day thus far but is now willing to consider weaning slowly and gradually. Evidently he has discussed this issue with his wife who also wants him to wean off Xanax.    No thoughts of self-harm or suicidal ideation.    As mentioned above, his job is secure and probably the best. The very thought of having such a stable and good job also creates a sense of fear in him.    He is not scared of heights, elevators, being alone or going on at a plane.    He does tend to think that in stressful situations he might catastrophize the situation      Anxiety   Presents for follow-up visit. Symptoms include excessive worry, insomnia and nervous/anxious behavior. Patient reports no chest pain, confusion, decreased concentration, dizziness,  hyperventilation, nausea, palpitations or shortness of breath. Symptoms occur occasionally. The severity of symptoms is interfering with daily activities and moderate. The quality of sleep is non-restorative.           Past Medical History:   Diagnosis Date    Anxiety     Depression      Social History     Socioeconomic History    Marital status:      Spouse name: Nila Whitman     Number of children: 2    Years of education: Not on file    Highest education level: Not on file   Social Needs    Financial resource strain: Not on file    Food insecurity - worry: Not on file    Food insecurity - inability: Not on file    Transportation needs - medical: Not on file    Transportation needs - non-medical: Not on file   Occupational History    Occupation:      Comment: [employer]   Tobacco Use    Smoking status: Never Smoker    Smokeless tobacco: Never Used   Substance and Sexual Activity    Alcohol use: Yes    Drug use: No    Sexual activity: Yes     Partners: Female   Other Topics Concern    Not on file   Social History Narrative    Not on file     Past Surgical History:   Procedure Laterality Date    APPENDECTOMY       Family History   Problem Relation Age of Onset    Heart disease Father        Review of Systems   Constitutional: Negative for activity change, appetite change, fatigue and unexpected weight change.   HENT: Negative for congestion, sneezing and trouble swallowing.    Eyes: Positive for redness. Negative for pain and visual disturbance.   Respiratory: Negative for cough, chest tightness and shortness of breath.         Patient's sleep apnea symptoms seem to be getting better better without mask.   Cardiovascular: Negative for chest pain, palpitations and leg swelling.   Gastrointestinal: Negative for abdominal distention, abdominal pain, blood in stool, constipation, diarrhea and nausea.   Endocrine: Negative for cold intolerance, heat  "intolerance, polydipsia, polyphagia and polyuria.   Genitourinary: Negative for dysuria and hematuria.   Musculoskeletal: Negative for back pain.   Skin: Negative for pallor, rash and wound.   Allergic/Immunologic: Negative for environmental allergies, food allergies and immunocompromised state.   Neurological: Positive for headaches. Negative for dizziness, seizures, speech difficulty, light-headedness and numbness.   Hematological: Negative for adenopathy. Does not bruise/bleed easily.   Psychiatric/Behavioral: Negative for agitation, behavioral problems, confusion and decreased concentration. The patient is nervous/anxious and has insomnia.         Underlying long-standing anxiety and depression.         Objective:      Blood pressure 131/81, pulse 72, height 6' 2" (1.88 m), weight (!) 157.9 kg (348 lb). Body mass index is 44.68 kg/m².  Physical Exam   Constitutional: He is oriented to person, place, and time. He appears well-developed.   Significant obesity with a BMI of 44   HENT:   Head: Normocephalic.   Mouth/Throat: No oropharyngeal exudate.   Eyes: Conjunctivae and EOM are normal.   Neck: Neck supple. No tracheal deviation present. No thyromegaly present.   Cardiovascular: Normal rate, regular rhythm and normal heart sounds.   Pulmonary/Chest: Effort normal and breath sounds normal.   Neurological: He is alert and oriented to person, place, and time. He has normal reflexes.   Psychiatric: His behavior is normal. Thought content normal. His mood appears anxious.   Mild signs of anxiety   Nursing note and vitals reviewed.        Assessment:       1. Generalized anxiety disorder    2. Screen for colon cancer           No visits with results within 3 Month(s) from this visit.   Latest known visit with results is:   No results found for any previous visit.         Plan:           Generalized anxiety disorder  -     ALPRAZolam (XANAX) 1 MG tablet; Usually no more than once a day and rarely twice a day as needed.  " Dispense: 15 tablet; Refill: 3    Screen for colon cancer  -     Cologuard Screening (Multitarget Stool DNA); Future; Expected date: 03/18/2019    Other orders  -     Cancel: Ambulatory referral to Gastroenterology      Again discussion about generalized anxiety has been carried out. I have complimented him on his efforts to wean off Xanax perhaps interventions like yoga or meditation will be helpful.      In past he had indicated that he would not like me to discuss with his spouse about his medical issues (which had become necessary because I was treating his spouse for issues which made it important to check about family and social issues)    At this point, he states that he understands the importance of such communication and he is more in communication and sync with his wife. I have not spoken to his wife on today's visit and concerning this visit.  Mindful thinking will be helpful.    Patient would again like to reconsider Cologuard instead of colonoscopy.    He would like to follow-up in a couple months for annual physical to discuss other preventive issues.      Current Outpatient Medications:     ALPRAZolam (XANAX) 1 MG tablet, Usually no more than once a day and rarely twice a day as needed., Disp: 15 tablet, Rfl: 3    sulfacetamide sodium 10% (BLEPH-10) 10 % ophthalmic solution, Place 1 drop into both eyes 4 (four) times daily., Disp: 5 mL, Rfl: 0

## 2019-05-13 DIAGNOSIS — Z00.00 ANNUAL PHYSICAL EXAM: Primary | ICD-10-CM

## 2019-05-16 LAB
CHOLEST SERPL-MCNC: 239 MG/DL
CHOLEST/HDLC SERPL: 4.8 (CALC)
GLUCOSE P FAST SERPL-MCNC: 112 MG/DL (ref 65–99)
HDLC SERPL-MCNC: 50 MG/DL
LDLC SERPL CALC-MCNC: 151 MG/DL (CALC)
NONHDLC SERPL-MCNC: 189 MG/DL (CALC)
TRIGL SERPL-MCNC: 249 MG/DL

## 2019-05-20 ENCOUNTER — OFFICE VISIT (OUTPATIENT)
Dept: FAMILY MEDICINE | Facility: CLINIC | Age: 49
End: 2019-05-20
Payer: COMMERCIAL

## 2019-05-20 VITALS
HEIGHT: 74 IN | DIASTOLIC BLOOD PRESSURE: 88 MMHG | HEART RATE: 77 BPM | SYSTOLIC BLOOD PRESSURE: 139 MMHG | BODY MASS INDEX: 40.43 KG/M2 | WEIGHT: 315 LBS

## 2019-05-20 DIAGNOSIS — I80.202: Primary | ICD-10-CM

## 2019-05-20 DIAGNOSIS — Z00.00 ANNUAL PHYSICAL EXAM: ICD-10-CM

## 2019-05-20 PROBLEM — B30.9 VIRAL CONJUNCTIVITIS OF BOTH EYES: Status: RESOLVED | Noted: 2019-02-25 | Resolved: 2019-05-20

## 2019-05-20 PROCEDURE — 99396 PREV VISIT EST AGE 40-64: CPT | Mod: 25,,, | Performed by: INTERNAL MEDICINE

## 2019-05-20 PROCEDURE — 99212 OFFICE O/P EST SF 10 MIN: CPT | Mod: ,,, | Performed by: INTERNAL MEDICINE

## 2019-05-20 PROCEDURE — 3008F PR BODY MASS INDEX (BMI) DOCUMENTED: ICD-10-PCS | Mod: ,,, | Performed by: INTERNAL MEDICINE

## 2019-05-20 PROCEDURE — 99212 PR OFFICE/OUTPT VISIT, EST, LEVL II, 10-19 MIN: ICD-10-PCS | Mod: ,,, | Performed by: INTERNAL MEDICINE

## 2019-05-20 PROCEDURE — 3008F BODY MASS INDEX DOCD: CPT | Mod: ,,, | Performed by: INTERNAL MEDICINE

## 2019-05-20 PROCEDURE — 99396 PR PREVENTIVE VISIT,EST,40-64: ICD-10-PCS | Mod: 25,,, | Performed by: INTERNAL MEDICINE

## 2019-05-20 NOTE — PATIENT INSTRUCTIONS
Exercise for a Healthier Heart  You may wonder how you can improve the health of your heart. If youre thinking about exercise, youre on the right track. You dont need to become an athlete, but you do need a certain amount of brisk exercise to help strengthen your heart. If you have been diagnosed with a heart condition, your doctor may recommend exercise to help stabilize your condition. To help make exercise a habit, choose safe, fun activities.     Exercise with a friend. When activity is fun, you're more likely to stick with it.     Be sure to check with your healthcare provider before starting an exercise program.   Why exercise?  Exercising regularly offers many healthy rewards. It can help you do all of the following:  · Improve your blood cholesterol level to help prevent further heart trouble  · Lower your blood pressure to help prevent a stroke or heart attack  · Control diabetes, or reduce your risk of getting this disease  · Improve your heart and lung function  · Reach and maintain a healthy weight  · Make your muscles stronger and more limber so you can stay active  · Prevent falls and fractures by slowing the loss of bone mass (osteoporosis)  · Manage stress better  · Reduce your blood pressure  · Improve your sense of self and your body image  Exercise tips  Ease into your routine. Set small goals. Then build on them.  Exercise on most days. Aim for a total of 150 or more minutes of moderate to  vigorous intensity activity each week. Consider 40 minutes, 3 to 4 times a week. For best results, activity should last for 40 minutes on average. It is OK to work up to the 40 minute period over time. Examples of moderate-intensity activity is walking 1 mile in 15 minutes or 30 to 45 minutes of yard work.  Step up your daily activity level. Along with your exercise program, try being more active throughout the day. Walk instead of drive. Do more household tasks or yard work.  Choose one or more  activities you enjoy. Walking is one of the easiest things you can do. You can also try swimming, riding a bike, dancing, or taking an exercise class.  Stop exercising and call your doctor if you:  · Have chest pain or feel dizzy or lightheaded  · Feel burning, tightness, pressure, or heaviness in your chest, neck, shoulders, back, or arms  · Have unusual shortness of breath  · Have increased joint or muscle pain  · Have palpitations or an irregular heartbeat   Date Last Reviewed: 5/1/2016 © 2000-2017 [employer]. [address] [place] [address]. All rights reserved. This information is not intended as a substitute for professional medical care. Always follow your healthcare professional's instructions.        Prevention Guidelines, Men Ages 50 to 64  Screening tests and vaccines are an important part of managing your health. Health counseling is essential, too. Below are guidelines for these, for men ages 50 to 64. Talk with your healthcare provider to make sure youre up-to-date on what you need.  Screening Who needs it How often   Alcohol misuse All men in this age group At routine exams   Blood pressure All men in this age group Every 2 years if your blood pressure is less than 120/80 mm Hg; yearly if your systolic blood pressure is 120 to 139 mm Hg, or your diastolic blood pressure reading is 80 to 89 mm Hg   Colorectal cancer All men in this age group Flexible sigmoidoscopy every 5 years, or colonoscopy every 10 years, or double-contrast barium enema every 5 years; yearly fecal occult blood test or fecal immunochemical test; or a stool DNA test as often as your healthcare provider advises; talk with your healthcare provider about which tests are best for you   Depression All men in this age group At routine exams   Type 2 diabetes or prediabetes All adults beginning at age 45 and adults without symptoms at any age who are overweight or obese and have 1 or more other risk factors for  diabetes At least every 3 years (yearly if your blood sugar has already begun to rise)   Hepatitis C Men at increased risk for infection - talk with your healthcare provider At routine exams. All men ages 50 to 70 should be tested at least once for hepatitis C.   High cholesterol or triglycerides All men in this age group At least every 5 years   HIV Men at increased risk for infection - talk with your healthcare provider At routine exams   Lung cancer Adults age 55 to 80 who have smoked Yearly screening in smokers with 30 pack-year history of smoking or who quit within 15 years   Obesity All men in this age group At routine exams   Prostate cancer Starting at age 45, talk to healthcare provider about risks and benefits of digital rectal exam (YOANNA) and prostate-specific antigen (PSA) screening1 At routine exams   Syphilis Men at increased risk for infection - talk with your healthcare provider At routine exams   Tuberculosis Men at increased risk for infection - talk with your healthcare provider Ask your healthcare provider   Vision All men in this age group Ask your healthcare provider   Vaccine Who needs it How often   Chickenpox (varicella) All men in this age group who have no record of this infection or vaccine 2 doses; second dose should be given at least 4 weeks after the first dose   Hepatitis A Men at increased risk for infection - talk with your healthcare provider 2 doses given at least 6 months apart   Hepatitis B Men at increased risk for infection - talk with your healthcare provider 3 doses over 6 months; second dose should be given 1 month after the first dose; the third dose should be given at least 2 months after the second dose and at least 4 months after the first dose   Haemophilus influenzae Type B (HIB) Men at increased risk for infection - talk with your healthcare provider 1 to 3 doses   Influenza (flu) All men in this age group Once a year   Measles, mumps, rubella (MMR) Men in this age  group through their late 50s who have no record of these infections or vaccines 1 or 2 doses; ask your healthcare provider   Meningococcal Men at increased risk for infection - talk with your healthcare provider 1 or more doses   Pneumococcal conjugate vaccine (PCV13) and pneumococcal polysaccharide vaccine (PPSV23) Men at increased risk for infection - talk with your healthcare provider PCV13: 1 dose ages 19 to 65 (protects against 13 types of pneumococcal bacteria)     PPSV23: 1 to 2 doses through age 64, or 1 dose at 65 or older (protects against 23 types of pneumococcal bacteria)      Tetanus/diphtheria/  pertussis (Td/Tdap) booster All men in this age group Td every 10 years, or a one-time dose of Tdap instead of a Td booster after age 18, then Td every 10 years   Zoster All men ages 60 and older 1 dose   Counseling Who needs it How often   Diet and exercise Men who are overweight or obese When diagnosed, and then at routine exams   Sexually transmitted infection prevention Men at increased risk for infection - talk with your healthcare provider At routine exams   Use of daily aspirin Men in this age group at risk for cardiovascular health problems At routine exams   Use of tobacco and the health effects it can cause All men in this age group Every visit   [address] [place] [address] [facility]  Date Last Reviewed: 2/1/2017  © 9479-1527 [employer]. [address] [place] [address], [place] [address]. All rights reserved. This information is not intended as a substitute for professional medical care. Always follow your healthcare professional's instructions.

## 2019-05-20 NOTE — PROGRESS NOTES
Subjective:       Patient ID: Arun Ellis is a 49 y.o. male.    Chief Complaint: Annual Exam (labreview ) and Rash (left leg )    Mr. Arun Ellis is a 49-year-old  male who comes for annual physical. He also has a rash on the left lower extremity with redness which would be addressed as an additional problem besides the physical.    Patient's major medical issues include obesity with a BMI of 44. He also had gone through a phase of intense anxiety and some depression significantly compounded by his sons significant illness and need for liver transplant several years back. Over the period of time this is slowly and steadily improving. At that phase of life he also had periods job changes which aggravated the anxiety.    Thus far he does not have a diagnosis of hypertension, diabetes mellitus or any pulmonary issues. Nonsmoker and social occasional alcohol use. No regular structured exercises. He is  and has a couple of children. As mentioned above 1 of his son had a liver transplant and is under constant follow-up with a liver transplant team at [facility]. He is in a monogamous relationship.    He is due for Cologuard testing for colon cancer screening. Last tetanus vaccination was in 2018 and no record of recent lipid panel thus far.    He drives safely. His job is that of a  for a group of [employer]. No direct exposure to chemicals, or pollutants or irritants.    Rash   This is a new problem. The current episode started in the past 7 days. The problem is unchanged. The affected locations include the left lower leg. The rash is characterized by redness and swelling. He was exposed to nothing. Pertinent negatives include no anorexia, congestion, diarrhea, facial edema, fatigue, rhinorrhea or vomiting. The treatment provided no relief. There is no history of allergies, asthma, eczema or varicella.       Past Medical History:   Diagnosis Date    Anxiety     Depression       Social History     Socioeconomic History    Marital status:      Spouse name: Nila Ellis     Number of children: 2    Years of education: Not on file    Highest education level: Not on file   Occupational History    Occupation:      Comment: [employer]   Social Needs    Financial resource strain: Not on file    Food insecurity:     Worry: Not on file     Inability: Not on file    Transportation needs:     Medical: Not on file     Non-medical: Not on file   Tobacco Use    Smoking status: Never Smoker    Smokeless tobacco: Never Used   Substance and Sexual Activity    Alcohol use: Yes     Alcohol/week: 0.6 - 1.2 oz     Types: 1 - 2 Glasses of wine per week     Comment: occ and rarely monthly    Drug use: No    Sexual activity: Yes     Partners: Female   Lifestyle    Physical activity:     Days per week: Not on file     Minutes per session: Not on file    Stress: Not at all   Relationships    Social connections:     Talks on phone: Not on file     Gets together: Not on file     Attends Judaism service: Not on file     Active member of club or organization: Not on file     Attends meetings of clubs or organizations: Not on file     Relationship status: Not on file   Other Topics Concern    Not on file   Social History Narrative    Not on file     Past Surgical History:   Procedure Laterality Date    APPENDECTOMY       Family History   Problem Relation Age of Onset    Heart disease Father         CABG, [place], Smoker    Diabetes Mother         Pre Diabetes       Review of Systems   Constitutional: Negative for activity change, chills, fatigue and unexpected weight change.        BMI of 44.8.   HENT: Negative for congestion, hearing loss, rhinorrhea and trouble swallowing.    Eyes: Negative for discharge and visual disturbance.   Respiratory: Negative for chest tightness and wheezing.    Cardiovascular: Positive for leg swelling (edema). Negative for chest pain and  "palpitations.   Gastrointestinal: Negative for anorexia, blood in stool, constipation, diarrhea and vomiting.   Endocrine: Negative for cold intolerance, heat intolerance, polydipsia and polyuria.   Genitourinary: Negative for difficulty urinating, enuresis, hematuria, penile pain and urgency.        Nocturia X 2 . Drinks tea at    Musculoskeletal: Negative for arthralgias, joint swelling and neck pain.   Skin: Positive for color change and rash.   Allergic/Immunologic: Negative for environmental allergies, food allergies and immunocompromised state.   Neurological: Negative for dizziness, weakness and headaches.   Hematological: Negative for adenopathy. Does not bruise/bleed easily.   Psychiatric/Behavioral: Positive for sleep disturbance (occ). Negative for confusion and dysphoric mood. The patient is nervous/anxious (getting better over a period of time.).          Objective:      Blood pressure 139/88, pulse 77, height 6' 2" (1.88 m), weight (!) 158.3 kg (349 lb). Body mass index is 44.81 kg/m².  Physical Exam   Constitutional: He is oriented to person, place, and time. He appears well-developed. No distress.   Significant obesity with a BMI of 44   HENT:   Head: Normocephalic.   Right Ear: Hearing and tympanic membrane normal. No decreased hearing is noted.   Left Ear: Hearing and tympanic membrane normal. No decreased hearing is noted.   Mouth/Throat: No oropharyngeal exudate.   Hearing is grossly adequate to whispers at approximately 6 feet bilaterally.   Eyes: Conjunctivae and EOM are normal. Right eye exhibits no chemosis and no discharge. Left eye exhibits no chemosis and no discharge. Right conjunctiva is not injected. Left conjunctiva is not injected. No scleral icterus.   Patient's vision is grossly adequate to half an inch letter at approximately 6 feet.   Neck: Neck supple. No tracheal deviation present. No thyromegaly present.   Cardiovascular: Normal rate, regular rhythm and normal heart sounds. " Exam reveals no friction rub.   No murmur heard.  Pulmonary/Chest: Effort normal and breath sounds normal. No stridor. No respiratory distress.   Abdominal: Soft. Bowel sounds are normal. He exhibits no distension. There is no tenderness.   Musculoskeletal: Normal range of motion. He exhibits edema (redness). He exhibits no tenderness or deformity.   Neurological: He is alert and oriented to person, place, and time. He has normal reflexes.   Skin: Rash noted. There is erythema. No pallor.   An area of redness and erythema with slight warmth is noted in the left lower extremity. No scaling. No associated lymphadenopathy. No area of wound or injuries noted. No Streaking is noted.   Psychiatric: His behavior is normal. Thought content normal. His mood appears anxious.   Mild signs of anxiety   Nursing note and vitals reviewed.                Assessment:       1. Phlebitis of deep vein of left lower extremity    2. Annual physical exam           Orders Only on 05/13/2019   Component Date Value Ref Range Status    Glucose, Plasma 05/15/2019 112* 65 - 99 mg/dL Final    Cholesterol 05/15/2019 239* <200 mg/dL Final    HDL 05/15/2019 50  >40 mg/dL Final    Triglycerides 05/15/2019 249* <150 mg/dL Final    LDL Cholesterol 05/15/2019 151* mg/dL (calc) Final    Hdl/Cholesterol Ratio 05/15/2019 4.8  <5.0 (calc) Final    Non HDL Chol. (LDL+VLDL) 05/15/2019 189* <130 mg/dL (calc) Final         Plan:           Phlebitis of deep vein of left lower extremity  Comments:  Patient has been recommended warm compresses in the lower extremity.    Annual physical exam  Comments:  Annual physical examination has been performed with evaluation of lipid panel and blood glucose levels. Appropriate recommendations made.      Advised Mr. Ellis about age and season appropriate immunizations/ cancer screenings.  Also seasonal influenza vaccine, update on tetanus diphtheria vaccination every 10 years.    Patient has been recommended  structured exercises and diet program. Lipid panel and blood glucose has been reviewed. Slight abnormalities in lipids and blood glucose. I've recommended him to weight loss of approximately 10 pounds in 6 months when he comes back for follow-up.    More greens and vegetables and avoid fatty food, fried and concentrated food sweets.    His anxiety is gradually getting better and use of Xanax is getting minimized.    He does have what appears to be a phlebitis in the lower extremity level advised him to apply warm compresses. He should let me know in the next 24-48 ours if the area is increasing in size. I will outlined it with a ball pen.    We may consider antibiotics at that point. He plates elevated. Keep his feet and lower extremities extremely clean.    Follow-up 6 months.    Current Outpatient Medications:     ALPRAZolam (XANAX) 1 MG tablet, Usually no more than once a day and rarely twice a day as needed., Disp: 15 tablet, Rfl: 3

## 2019-05-23 ENCOUNTER — PATIENT MESSAGE (OUTPATIENT)
Dept: FAMILY MEDICINE | Facility: CLINIC | Age: 49
End: 2019-05-23

## 2019-05-24 DIAGNOSIS — I80.202: ICD-10-CM

## 2019-05-24 DIAGNOSIS — I80.202: Primary | ICD-10-CM

## 2019-05-24 RX ORDER — CEPHALEXIN 500 MG/1
500 CAPSULE ORAL EVERY 8 HOURS
Qty: 30 CAPSULE | Refills: 0 | Status: SHIPPED | OUTPATIENT
Start: 2019-05-24 | End: 2019-06-11

## 2019-05-24 RX ORDER — ASPIRIN 325 MG
325 TABLET ORAL 2 TIMES DAILY
Qty: 20 TABLET | Refills: 0 | COMMUNITY
Start: 2019-05-24 | End: 2023-01-30

## 2019-05-24 RX ORDER — CEPHALEXIN 500 MG/1
500 CAPSULE ORAL EVERY 8 HOURS
Qty: 30 CAPSULE | OUTPATIENT
Start: 2019-05-24 | End: 2019-05-24 | Stop reason: SDUPTHER

## 2019-05-24 NOTE — PROGRESS NOTES
Sent Lloyd- redness persists- pt plans to travel. Check USG and Keflex tid for 19 days wit aspirin 325 BID

## 2019-05-27 DIAGNOSIS — M79.89 PAIN AND SWELLING OF LEFT LOWER LEG: Primary | ICD-10-CM

## 2019-05-27 DIAGNOSIS — M79.662 PAIN AND SWELLING OF LEFT LOWER LEG: Primary | ICD-10-CM

## 2019-05-29 ENCOUNTER — TELEPHONE (OUTPATIENT)
Dept: FAMILY MEDICINE | Facility: CLINIC | Age: 49
End: 2019-05-29

## 2019-05-29 NOTE — TELEPHONE ENCOUNTER
----- Message from Alphonse Parks MD sent at 5/28/2019  5:30 PM CDT -----  The results are within acceptable range.  Please keep regular follow up.

## 2019-06-02 ENCOUNTER — PATIENT MESSAGE (OUTPATIENT)
Dept: FAMILY MEDICINE | Facility: CLINIC | Age: 49
End: 2019-06-02

## 2019-06-08 ENCOUNTER — PATIENT MESSAGE (OUTPATIENT)
Dept: FAMILY MEDICINE | Facility: CLINIC | Age: 49
End: 2019-06-08

## 2019-06-11 ENCOUNTER — OFFICE VISIT (OUTPATIENT)
Dept: FAMILY MEDICINE | Facility: CLINIC | Age: 49
End: 2019-06-11
Payer: COMMERCIAL

## 2019-06-11 ENCOUNTER — PATIENT MESSAGE (OUTPATIENT)
Dept: FAMILY MEDICINE | Facility: CLINIC | Age: 49
End: 2019-06-11

## 2019-06-11 VITALS
BODY MASS INDEX: 40.43 KG/M2 | DIASTOLIC BLOOD PRESSURE: 87 MMHG | HEIGHT: 74 IN | SYSTOLIC BLOOD PRESSURE: 139 MMHG | HEART RATE: 62 BPM | WEIGHT: 315 LBS

## 2019-06-11 DIAGNOSIS — R60.0 LEG EDEMA, LEFT: ICD-10-CM

## 2019-06-11 DIAGNOSIS — I80.202: Primary | ICD-10-CM

## 2019-06-11 PROCEDURE — 3008F PR BODY MASS INDEX (BMI) DOCUMENTED: ICD-10-PCS | Mod: ,,, | Performed by: INTERNAL MEDICINE

## 2019-06-11 PROCEDURE — 99214 OFFICE O/P EST MOD 30 MIN: CPT | Mod: ,,, | Performed by: INTERNAL MEDICINE

## 2019-06-11 PROCEDURE — 99214 PR OFFICE/OUTPT VISIT, EST, LEVL IV, 30-39 MIN: ICD-10-PCS | Mod: ,,, | Performed by: INTERNAL MEDICINE

## 2019-06-11 PROCEDURE — 3008F BODY MASS INDEX DOCD: CPT | Mod: ,,, | Performed by: INTERNAL MEDICINE

## 2019-06-11 RX ORDER — CLARITHROMYCIN 500 MG/1
500 TABLET, FILM COATED ORAL EVERY 12 HOURS
Qty: 20 TABLET | Refills: 0 | Status: SHIPPED | OUTPATIENT
Start: 2019-06-11 | End: 2019-06-21

## 2019-06-11 NOTE — PATIENT INSTRUCTIONS

## 2019-06-11 NOTE — PROGRESS NOTES
Subjective:       Patient ID: Arun Ellis is a 49 y.o. male.    Chief Complaint: Leg Swelling    Mr. Arun Ellis is 49-year-old  male was seen a few weeks ago in my office for what appeared to be new onset of swelling and redness in the left lower extremity. Initially I thought that we might be dealing with with phlebitis (without infection) possibly secondary to edema and possibly a local minor injury. Patient felt that he had cellulitis as had been informed by numerous of his well wishing friends and reluctantly I prescribed a course of Keflex for 7 days. This did not bring about any material improvement and he persists with the same redness and swelling of the little worse and more dusky. No pain, fever or chills.    He comes for follow-up today.    Again as mentioned above, no fevers or chills. No significant pain except for some discomfort while walking in the ankle no definite swelling or arthritic symptoms.    Underlying medical issues include significant obesity with a BMI of 44. Chronic insomnia and mild degree of chronic anxiety.    Patient is nonsmoker and no significant alcohol use. His job involves both standing and sitting.    He has slightly elevated blood sugars and slightly elevated lipids in past. He is not on any medications like amlodipine or gabapentin which can cause edema.    Thus far, he has not been diagnosed with any autoimmune disorder like Crohn's disease, ulcerative colitis or any infectious disease left tuberculosis.    On the first visit I had also ordered an ultrasound of the left lower estimate he which was negative for DVT.      Past Medical History:   Diagnosis Date    Anxiety     Depression      Social History     Socioeconomic History    Marital status:      Spouse name: Nila Ellis     Number of children: 2    Years of education: Not on file    Highest education level: Not on file   Occupational History    Occupation:      Comment: [employer]   Social Needs    Financial resource strain: Not on file    Food insecurity:     Worry: Not on file     Inability: Not on file    Transportation needs:     Medical: Not on file     Non-medical: Not on file   Tobacco Use    Smoking status: Never Smoker    Smokeless tobacco: Never Used   Substance and Sexual Activity    Alcohol use: Yes     Alcohol/week: 0.6 - 1.2 oz     Types: 1 - 2 Glasses of wine per week     Comment: occ and rarely monthly    Drug use: No    Sexual activity: Yes     Partners: Female   Lifestyle    Physical activity:     Days per week: Not on file     Minutes per session: Not on file    Stress: Not at all   Relationships    Social connections:     Talks on phone: Not on file     Gets together: Not on file     Attends Spiritism service: Not on file     Active member of club or organization: Not on file     Attends meetings of clubs or organizations: Not on file     Relationship status: Not on file   Other Topics Concern    Not on file   Social History Narrative    Not on file     Past Surgical History:   Procedure Laterality Date    APPENDECTOMY       Family History   Problem Relation Age of Onset    Heart disease Father         CABG, MI, Smoker    Diabetes Mother         Pre Diabetes       Review of Systems   Constitutional: Negative for activity change, chills, fatigue and unexpected weight change.        BMI of 44.8.   HENT: Negative for congestion, hearing loss, rhinorrhea and trouble swallowing.    Eyes: Negative for discharge and visual disturbance.   Respiratory: Negative for apnea, choking, chest tightness and wheezing.    Cardiovascular: Positive for leg swelling (edema). Negative for chest pain and palpitations.   Gastrointestinal: Negative for blood in stool, constipation, diarrhea and vomiting.   Endocrine: Negative for cold intolerance, heat intolerance, polydipsia and polyuria.   Genitourinary: Negative for difficulty urinating, enuresis, hematuria,  "penile pain and urgency.        Nocturia X 2 . Drinks tea at    Musculoskeletal: Negative for arthralgias, joint swelling and neck pain.   Skin: Positive for color change and rash.   Allergic/Immunologic: Negative for environmental allergies, food allergies and immunocompromised state.   Neurological: Negative for dizziness, weakness and headaches.   Hematological: Negative for adenopathy. Does not bruise/bleed easily.   Psychiatric/Behavioral: Positive for sleep disturbance (occ). Negative for confusion and dysphoric mood. The patient is nervous/anxious (getting better over a period of time.).          Objective:      Blood pressure 139/87, pulse 62, height 6' 2" (1.88 m), weight (!) 156 kg (344 lb). Body mass index is 44.17 kg/m².  Physical Exam   Constitutional: He is oriented to person, place, and time. He appears well-developed. No distress.   Significant obesity with a BMI of 44   HENT:   Head: Normocephalic.   Mouth/Throat: No oropharyngeal exudate.   Eyes: EOM are normal. Right conjunctiva is not injected. Left conjunctiva is not injected. No scleral icterus.   Neck: Neck supple. No tracheal deviation present. No thyromegaly present.   Cardiovascular: Normal rate, regular rhythm and normal heart sounds. Exam reveals no friction rub.   No murmur heard.  Pulmonary/Chest: Effort normal and breath sounds normal. No stridor. No respiratory distress.   Abdominal: Soft. Bowel sounds are normal. He exhibits no distension. There is no tenderness.   Musculoskeletal: Normal range of motion. He exhibits edema (redness edema 1+ mostly left and ? rt). He exhibits no tenderness or deformity.        Legs:  Lymphadenopathy:     He has no cervical adenopathy.     He has no axillary adenopathy.        Right: No inguinal adenopathy present.        Left: No inguinal adenopathy present.   Neurological: He is alert and oriented to person, place, and time. He has normal reflexes.   Skin: Rash noted. There is erythema. No pallor. "   An area of redness and erythema with slight warmth is noted in the left lower extremity. No scaling. No associated lymphadenopathy. No area of wound or injuries noted. No Streaking is noted.   Psychiatric: His behavior is normal. Thought content normal.   Nursing note and vitals reviewed.                    Assessment:       1. Phlebitis of deep vein of left lower extremity    2. Leg edema, left           Orders Only on 05/13/2019   Component Date Value Ref Range Status    Glucose, Plasma 05/15/2019 112* 65 - 99 mg/dL Final    Cholesterol 05/15/2019 239* <200 mg/dL Final    HDL 05/15/2019 50  >40 mg/dL Final    Triglycerides 05/15/2019 249* <150 mg/dL Final    LDL Cholesterol 05/15/2019 151* mg/dL (calc) Final    Hdl/Cholesterol Ratio 05/15/2019 4.8  <5.0 (calc) Final    Non HDL Chol. (LDL+VLDL) 05/15/2019 189* <130 mg/dL (calc) Final     Patient persists to have edema, some chronic stasis changes now with redness in the left lower exudate.    DVT was ruled out during previous ultrasound at least in the entire left lower exudate. Possibility of a abdominal pain DVT especially on the left side in the area vessels or renal vessels would be distant consideration.    Obesity itself can cause some lower extremity edema especially from a hanging pannus which is not very prominent in his case.    Only to recheck his liver tests and kidney test to be sure that we are not missing any hepatic or renal issues.    Patient does not have any fever or chills indicative of possibility of a strep infection.    Again he does not recall any insect bite though he has more the issue of Lyme's disease.    Phlegmasia Alba Dolons or unusual form of erythema Nodosum are distant considerations.    Trial of colchicine or aspirin may also be a consideration.    I'll check for Dr. Servin also if we might potentially be dealing with an infection.    Plan:           Phlebitis of deep vein of left lower extremity  -     clarithromycin  (BIAXIN) 500 MG tablet; Take 1 tablet (500 mg total) by mouth every 12 (twelve) hours. for 10 days  Dispense: 20 tablet; Refill: 0  -     CBC auto differential; Future; Expected date: 06/11/2019  -     Comprehensive metabolic panel; Future; Expected date: 06/11/2019  -     Sedimentation rate; Future; Expected date: 06/11/2019  -     C-reactive protein; Future; Expected date: 06/11/2019    Leg edema, left  -     CBC auto differential; Future; Expected date: 06/11/2019  -     Comprehensive metabolic panel; Future; Expected date: 06/11/2019  -     Sedimentation rate; Future; Expected date: 06/11/2019  -     C-reactive protein; Future; Expected date: 06/11/2019      Check labs including CBC, CMP, CRP and sedimentation rate. Perhaps another round of antibiotics.  Keep legs elevated. May have to consider compression stockings.    Based upon the labs, may consider diuretics or further workup especially if any inflammatory markers elevated. Check for liver function test and renal function and albumin also.          Current Outpatient Medications:     ALPRAZolam (XANAX) 1 MG tablet, Usually no more than once a day and rarely twice a day as needed., Disp: 15 tablet, Rfl: 3    aspirin 325 MG tablet, Take 1 tablet (325 mg total) by mouth 2 (two) times daily. for 10 days, Disp: 20 tablet, Rfl: 0    clarithromycin (BIAXIN) 500 MG tablet, Take 1 tablet (500 mg total) by mouth every 12 (twelve) hours. for 10 days, Disp: 20 tablet, Rfl: 0

## 2019-06-11 NOTE — Clinical Note
Shameka Carpenter would appreciate if you could spend a few minutes reviewing this patient's recent progress note and compared the pictures of his left lower extremity with a progress note on 05/21/2019. My initial impression was thrombophlebitis or phlebitis in a setting of edema. Ultrasound was negative for DVT. I did treat him with Keflex for 1 week with no overall improvement in his symptoms. Except for redness and some edema patient is not in pain or has any fever or chills. I have put my thought process in the assessment section and I wonder if again within infection and a I seek your guidance for the same.Patient has no major medical issues but have ordered a CMP, CBC, sedimentation rate and CRP.Kind Rossi Parks M.D.

## 2019-06-13 ENCOUNTER — TELEPHONE (OUTPATIENT)
Dept: FAMILY MEDICINE | Facility: CLINIC | Age: 49
End: 2019-06-13

## 2019-06-13 ENCOUNTER — PATIENT MESSAGE (OUTPATIENT)
Dept: FAMILY MEDICINE | Facility: CLINIC | Age: 49
End: 2019-06-13

## 2019-06-13 DIAGNOSIS — M79.605 LEG PAIN, LEFT: Primary | ICD-10-CM

## 2019-06-13 DIAGNOSIS — R74.8 ELEVATED ALKALINE PHOSPHATASE LEVEL: ICD-10-CM

## 2019-06-13 LAB
ALBUMIN SERPL-MCNC: 4.2 G/DL (ref 3.6–5.1)
ALBUMIN/GLOB SERPL: 1.3 (CALC) (ref 1–2.5)
ALP SERPL-CCNC: 148 U/L (ref 40–115)
ALT SERPL-CCNC: 13 U/L (ref 9–46)
AST SERPL-CCNC: 12 U/L (ref 10–35)
BASOPHILS # BLD AUTO: 41 CELLS/UL (ref 0–200)
BASOPHILS NFR BLD AUTO: 0.6 %
BILIRUB SERPL-MCNC: 0.5 MG/DL (ref 0.2–1.2)
BUN SERPL-MCNC: 15 MG/DL (ref 7–25)
BUN/CREAT SERPL: ABNORMAL (CALC) (ref 6–22)
CALCIUM SERPL-MCNC: 9.3 MG/DL (ref 8.6–10.3)
CHLORIDE SERPL-SCNC: 102 MMOL/L (ref 98–110)
CO2 SERPL-SCNC: 27 MMOL/L (ref 20–32)
CREAT SERPL-MCNC: 0.72 MG/DL (ref 0.7–1.33)
CRP SERPL-MCNC: 26.5 MG/L
EOSINOPHIL # BLD AUTO: 143 CELLS/UL (ref 15–500)
EOSINOPHIL NFR BLD AUTO: 2.1 %
ERYTHROCYTE [DISTWIDTH] IN BLOOD BY AUTOMATED COUNT: 13.1 % (ref 11–15)
ERYTHROCYTE [SEDIMENTATION RATE] IN BLOOD BY WESTERGREN METHOD: 19 MM/H
GFRSERPLBLD MDRD-ARVRAT: 108 ML/MIN/1.73M2
GLOBULIN SER CALC-MCNC: 3.2 G/DL (CALC) (ref 1.9–3.7)
GLUCOSE SERPL-MCNC: 101 MG/DL (ref 65–99)
HCT VFR BLD AUTO: 40.4 % (ref 38.5–50)
HGB BLD-MCNC: 13.5 G/DL (ref 13.2–17.1)
LYMPHOCYTES # BLD AUTO: 1693 CELLS/UL (ref 850–3900)
LYMPHOCYTES NFR BLD AUTO: 24.9 %
MCH RBC QN AUTO: 28.2 PG (ref 27–33)
MCHC RBC AUTO-ENTMCNC: 33.4 G/DL (ref 32–36)
MCV RBC AUTO: 84.3 FL (ref 80–100)
MONOCYTES # BLD AUTO: 524 CELLS/UL (ref 200–950)
MONOCYTES NFR BLD AUTO: 7.7 %
NEUTROPHILS # BLD AUTO: 4400 CELLS/UL (ref 1500–7800)
NEUTROPHILS NFR BLD AUTO: 64.7 %
PLATELET # BLD AUTO: 302 THOUSAND/UL (ref 140–400)
PMV BLD REES-ECKER: 9.8 FL (ref 7.5–12.5)
POTASSIUM SERPL-SCNC: 4.2 MMOL/L (ref 3.5–5.3)
PROT SERPL-MCNC: 7.4 G/DL (ref 6.1–8.1)
RBC # BLD AUTO: 4.79 MILLION/UL (ref 4.2–5.8)
SODIUM SERPL-SCNC: 139 MMOL/L (ref 135–146)
WBC # BLD AUTO: 6.8 THOUSAND/UL (ref 3.8–10.8)

## 2019-06-13 NOTE — TELEPHONE ENCOUNTER
Patient's labs reviewed. CBC and sedimentation rate is normal. Chemistries normal except for elevated alkaline phosphatase. CRP is elevated.   Patient has been prescribed Biaxin with no significant improvement at this point.    Check left leg x-ray.

## 2019-06-13 NOTE — TELEPHONE ENCOUNTER
----- Message from Alphonse Parks MD sent at 6/12/2019 10:42 PM CDT -----  The results are within acceptable range.  Please keep regular follow up.

## 2019-06-17 ENCOUNTER — TELEPHONE (OUTPATIENT)
Dept: FAMILY MEDICINE | Facility: CLINIC | Age: 49
End: 2019-06-17

## 2019-06-17 NOTE — TELEPHONE ENCOUNTER
----- Message from Alphonse Parks MD sent at 6/15/2019  7:05 AM CDT -----  The results are within acceptable range.  Please keep regular follow up.

## 2019-07-01 ENCOUNTER — PATIENT MESSAGE (OUTPATIENT)
Dept: FAMILY MEDICINE | Facility: CLINIC | Age: 49
End: 2019-07-01

## 2019-07-01 DIAGNOSIS — I80.202: Primary | ICD-10-CM

## 2019-07-01 DIAGNOSIS — F41.1 GENERALIZED ANXIETY DISORDER: ICD-10-CM

## 2019-07-01 DIAGNOSIS — R21 RASH: ICD-10-CM

## 2019-07-04 RX ORDER — ALPRAZOLAM 1 MG/1
TABLET ORAL
Qty: 15 TABLET | Refills: 3 | Status: SHIPPED | OUTPATIENT
Start: 2019-07-04 | End: 2019-11-19

## 2019-07-23 ENCOUNTER — OFFICE VISIT (OUTPATIENT)
Dept: DERMATOLOGY | Facility: CLINIC | Age: 49
End: 2019-07-23
Payer: COMMERCIAL

## 2019-07-23 VITALS — HEIGHT: 74 IN | WEIGHT: 315 LBS | BODY MASS INDEX: 40.43 KG/M2

## 2019-07-23 DIAGNOSIS — L94.0 MORPHEA: ICD-10-CM

## 2019-07-23 DIAGNOSIS — I87.8 VENOUS STASIS: ICD-10-CM

## 2019-07-23 DIAGNOSIS — R60.9 EDEMA, UNSPECIFIED TYPE: Primary | ICD-10-CM

## 2019-07-23 DIAGNOSIS — I87.2 VENOUS STASIS DERMATITIS, UNSPECIFIED LATERALITY: ICD-10-CM

## 2019-07-23 DIAGNOSIS — M79.3 LIPODERMATOSCLEROSIS: ICD-10-CM

## 2019-07-23 PROCEDURE — 3008F BODY MASS INDEX DOCD: CPT | Mod: CPTII,S$GLB,, | Performed by: DERMATOLOGY

## 2019-07-23 PROCEDURE — 99999 PR PBB SHADOW E&M-NEW PATIENT-LVL III: ICD-10-PCS | Mod: PBBFAC,,, | Performed by: DERMATOLOGY

## 2019-07-23 PROCEDURE — 99203 OFFICE O/P NEW LOW 30 MIN: CPT | Mod: S$GLB,,, | Performed by: DERMATOLOGY

## 2019-07-23 PROCEDURE — 99999 PR PBB SHADOW E&M-NEW PATIENT-LVL III: CPT | Mod: PBBFAC,,, | Performed by: DERMATOLOGY

## 2019-07-23 PROCEDURE — 99203 PR OFFICE/OUTPT VISIT, NEW, LEVL III, 30-44 MIN: ICD-10-PCS | Mod: S$GLB,,, | Performed by: DERMATOLOGY

## 2019-07-23 PROCEDURE — 3008F PR BODY MASS INDEX (BMI) DOCUMENTED: ICD-10-PCS | Mod: CPTII,S$GLB,, | Performed by: DERMATOLOGY

## 2019-07-23 RX ORDER — FLUOCINONIDE 0.5 MG/G
CREAM TOPICAL 2 TIMES DAILY
Qty: 60 G | Refills: 1 | Status: SHIPPED | OUTPATIENT
Start: 2019-07-23 | End: 2019-11-19

## 2019-07-23 NOTE — PROGRESS NOTES
Subjective:       Patient ID:  Arun Ellis is a 49 y.o. male who presents for   Chief Complaint   Patient presents with    Skin Discoloration     L lower leg     HPI     Initial consult  No phx or fhx of skin cancer.    Presents today with some skin discoloration on the L lower leg, x 8 weeks, some swelling during the day, no tx.  No pain.  No previous injury.  Had US for dvt negative.  Had xray also wo any findings.    Review of Systems   Constitutional: Negative for fever, chills and fatigue.   HENT: Negative for nosebleeds, congestion and sore throat.    Respiratory: Negative for cough and shortness of breath.    Gastrointestinal: Negative for vomiting, diarrhea and constipation.   Musculoskeletal: Positive for joint swelling. Negative for arthralgias.   Skin: Positive for activity-related sunscreen use and wears hat. Negative for daily sunscreen use.   Hematologic/Lymphatic: Does not bruise/bleed easily.        Objective:    Physical Exam   Constitutional: He appears well-developed and well-nourished. No distress.   Eyes: No conjunctival no injection.   Cardiovascular: There is dependent edema.  There is no local extremity swelling.     Neurological: He is alert and oriented to person, place, and time. He is not disoriented.   Psychiatric: He has a normal mood and affect.   Skin:   Areas Examined (abnormalities noted in diagram):   Scalp / Hair Palpated and Inspected  Head / Face Inspection Performed  Neck Inspection Performed  RUE Inspected  LUE Inspection Performed  RLE Inspected  LLE Inspection Performed  Nails and Digits Inspection Performed                       Diagram Legend     Erythematous scaling macule/papule c/w actinic keratosis       Vascular papule c/w angioma      Pigmented verrucoid papule/plaque c/w seborrheic keratosis      Yellow umbilicated papule c/w sebaceous hyperplasia      Irregularly shaped tan macule c/w lentigo     1-2 mm smooth white papules consistent with Milia       Movable subcutaneous cyst with punctum c/w epidermal inclusion cyst      Subcutaneous movable cyst c/w pilar cyst      Firm pink to brown papule c/w dermatofibroma      Pedunculated fleshy papule(s) c/w skin tag(s)      Evenly pigmented macule c/w junctional nevus     Mildly variegated pigmented, slightly irregular-bordered macule c/w mildly atypical nevus      Flesh colored to evenly pigmented papule c/w intradermal nevus       Pink pearly papule/plaque c/w basal cell carcinoma      Erythematous hyperkeratotic cursted plaque c/w SCC      Surgical scar with no sign of skin cancer recurrence      Open and closed comedones      Inflammatory papules and pustules      Verrucoid papule consistent consistent with wart     Erythematous eczematous patches and plaques     Dystrophic onycholytic nail with subungual debris c/w onychomycosis     Umbilicated papule    Erythematous-base heme-crusted tan verrucoid plaque consistent with inflamed seborrheic keratosis     Erythematous Silvery Scaling Plaque c/w Psoriasis     See annotation      Assessment / Plan:        Edema, unspecified type  Discussed with patient the etiology and pathogenesis of the disease or skin lesion(s) and possible treatments and aggravators.      Venous stasis dermatitis, unspecified laterality  -     fluocinonide 0.05% (LIDEX) 0.05 % cream; Apply topically 2 (two) times daily. Left lower leg.  Dispense: 60 g; Refill: 1  Discussed with patient the etiology and pathogenesis of the disease or skin lesion(s) and possible treatments and aggravators.    Reviewed with patient different treatment options and associated risks.  Discussed with patient need to wear compression stockings and to elevate legs regularly, especially with sitting.  Patient to consider elevating legs during sleep if no pain in the toes or numbness.  Patient can consider using ace wrap in lieu of stockings but has to watch out for bluing of the toes or pain or numbness in the toes.   Patient may need evaluation with their primary or cardiologist for leg swelling.  Discussed with patient the risks of topical steroids, including, but not limited, to atrophy, rosacea, acne, glaucoma, cataracts, adrenal suppression, striae.  Chronic nature of this condition discussed with patient.    Venous stasis  Discussed with patient need to wear compression stockings and to elevate legs regularly, especially with sitting.  Patient to consider elevating legs during sleep if no pain in the toes or numbness.  Patient can consider using ace wrap in lieu of stockings but has to watch out for bluing of the toes or pain or numbness in the toes.  Patient may need evaluation with their primary or cardiologist for leg swelling.    Morphea  -     fluocinonide 0.05% (LIDEX) 0.05 % cream; Apply topically 2 (two) times daily. Left lower leg.  Dispense: 60 g; Refill: 1  Discussed with patient the etiology and pathogenesis of the disease or skin lesion(s) and possible treatments and aggravators.    Watch for this, although not very likely.    Lipodermatosclerosis  -     fluocinonide 0.05% (LIDEX) 0.05 % cream; Apply topically 2 (two) times daily. Left lower leg.  Dispense: 60 g; Refill: 1  May need orals.  Discussed with patient the etiology and pathogenesis of the disease or skin lesion(s) and possible treatments and aggravators.    Chronic nature of this condition discussed with patient.  Written instructions provided to the patient or guardian today.             Follow up in about 3 weeks (around 8/13/2019).

## 2019-08-13 ENCOUNTER — OFFICE VISIT (OUTPATIENT)
Dept: DERMATOLOGY | Facility: CLINIC | Age: 49
End: 2019-08-13
Payer: COMMERCIAL

## 2019-08-13 VITALS — BODY MASS INDEX: 40.43 KG/M2 | WEIGHT: 315 LBS | HEIGHT: 74 IN

## 2019-08-13 DIAGNOSIS — L81.9 HYPERPIGMENTATION: ICD-10-CM

## 2019-08-13 DIAGNOSIS — I87.2 VENOUS STASIS DERMATITIS, UNSPECIFIED LATERALITY: ICD-10-CM

## 2019-08-13 DIAGNOSIS — D49.9 NEOPLASM: Primary | ICD-10-CM

## 2019-08-13 DIAGNOSIS — D22.9 NUMEROUS MOLES: ICD-10-CM

## 2019-08-13 DIAGNOSIS — I87.8 VENOUS STASIS: ICD-10-CM

## 2019-08-13 DIAGNOSIS — M79.3 LIPODERMATOSCLEROSIS: ICD-10-CM

## 2019-08-13 DIAGNOSIS — D48.9 NEOPLASM OF UNCERTAIN BEHAVIOR: ICD-10-CM

## 2019-08-13 DIAGNOSIS — R60.9 EDEMA, UNSPECIFIED TYPE: ICD-10-CM

## 2019-08-13 PROCEDURE — 88342 IMHCHEM/IMCYTCHM 1ST ANTB: CPT | Mod: 26,,, | Performed by: PATHOLOGY

## 2019-08-13 PROCEDURE — 99999 PR PBB SHADOW E&M-EST. PATIENT-LVL III: CPT | Mod: PBBFAC,,, | Performed by: DERMATOLOGY

## 2019-08-13 PROCEDURE — 11102 PR TANGENTIAL BIOPSY, SKIN, SINGLE LESION: ICD-10-PCS | Mod: S$GLB,,, | Performed by: DERMATOLOGY

## 2019-08-13 PROCEDURE — 99999 PR PBB SHADOW E&M-EST. PATIENT-LVL III: ICD-10-PCS | Mod: PBBFAC,,, | Performed by: DERMATOLOGY

## 2019-08-13 PROCEDURE — 3008F BODY MASS INDEX DOCD: CPT | Mod: CPTII,S$GLB,, | Performed by: DERMATOLOGY

## 2019-08-13 PROCEDURE — 11103 TANGNTL BX SKIN EA SEP/ADDL: CPT | Mod: S$GLB,,, | Performed by: DERMATOLOGY

## 2019-08-13 PROCEDURE — 11103 PR TANGENTIAL BIOPSY, SKIN, EA ADDTL LESION: ICD-10-PCS | Mod: S$GLB,,, | Performed by: DERMATOLOGY

## 2019-08-13 PROCEDURE — 99213 OFFICE O/P EST LOW 20 MIN: CPT | Mod: 25,S$GLB,, | Performed by: DERMATOLOGY

## 2019-08-13 PROCEDURE — 88305 TISSUE SPECIMEN TO PATHOLOGY, DERMATOLOGY: ICD-10-PCS | Mod: 26,,, | Performed by: PATHOLOGY

## 2019-08-13 PROCEDURE — 88305 TISSUE EXAM BY PATHOLOGIST: CPT | Mod: 26,,, | Performed by: PATHOLOGY

## 2019-08-13 PROCEDURE — 88342 TISSUE SPECIMEN TO PATHOLOGY, DERMATOLOGY: ICD-10-PCS | Mod: 26,,, | Performed by: PATHOLOGY

## 2019-08-13 PROCEDURE — 88342 IMHCHEM/IMCYTCHM 1ST ANTB: CPT | Performed by: PATHOLOGY

## 2019-08-13 PROCEDURE — 99213 PR OFFICE/OUTPT VISIT, EST, LEVL III, 20-29 MIN: ICD-10-PCS | Mod: 25,S$GLB,, | Performed by: DERMATOLOGY

## 2019-08-13 PROCEDURE — 88305 TISSUE EXAM BY PATHOLOGIST: CPT | Performed by: PATHOLOGY

## 2019-08-13 PROCEDURE — 11102 TANGNTL BX SKIN SINGLE LES: CPT | Mod: S$GLB,,, | Performed by: DERMATOLOGY

## 2019-08-13 PROCEDURE — 3008F PR BODY MASS INDEX (BMI) DOCUMENTED: ICD-10-PCS | Mod: CPTII,S$GLB,, | Performed by: DERMATOLOGY

## 2019-08-13 NOTE — PROGRESS NOTES
Subjective:       Patient ID:  Arun Ellis is a 49 y.o. male who presents for No chief complaint on file.    Patient complains of lesion(s)  Location: back  Duration: years  Symptoms: none  Relieving factors/Previous treatments: none        Last o/v 7/23/2019    Edema, unspecified type  Discussed with patient the etiology and pathogenesis of the disease or skin lesion(s) and possible treatments and aggravators.       Venous stasis dermatitis, unspecified laterality  -     fluocinonide 0.05% (LIDEX) 0.05 % cream; Apply topically 2 (two) times daily. Left lower leg.  Dispense: 60 g; Refill: 1  Discussed with patient the etiology and pathogenesis of the disease or skin lesion(s) and possible treatments and aggravators.    Reviewed with patient different treatment options and associated risks.  Discussed with patient need to wear compression stockings and to elevate legs regularly, especially with sitting.  Patient to consider elevating legs during sleep if no pain in the toes or numbness.  Patient can consider using ace wrap in lieu of stockings but has to watch out for bluing of the toes or pain or numbness in the toes.  Patient may need evaluation with their primary or cardiologist for leg swelling.  Discussed with patient the risks of topical steroids, including, but not limited, to atrophy, rosacea, acne, glaucoma, cataracts, adrenal suppression, striae.  Chronic nature of this condition discussed with patient.     Venous stasis  Discussed with patient need to wear compression stockings and to elevate legs regularly, especially with sitting.  Patient to consider elevating legs during sleep if no pain in the toes or numbness.  Patient can consider using ace wrap in lieu of stockings but has to watch out for bluing of the toes or pain or numbness in the toes.  Patient may need evaluation with their primary or cardiologist for leg swelling.     Morphea  -     fluocinonide 0.05% (LIDEX) 0.05 % cream; Apply  topically 2 (two) times daily. Left lower leg.  Dispense: 60 g; Refill: 1  Discussed with patient the etiology and pathogenesis of the disease or skin lesion(s) and possible treatments and aggravators.    Watch for this, although not very likely.     Lipodermatosclerosis  -     fluocinonide 0.05% (LIDEX) 0.05 % cream; Apply topically 2 (two) times daily. Left lower leg.  Dispense: 60 g; Refill: 1  May need orals.  Discussed with patient the etiology and pathogenesis of the disease or skin lesion(s) and possible treatments and aggravators.    Chronic nature of this condition discussed with patient.  Written instructions provided to the patient or guardian today.      Patient presents today for edema and skin derm follow up to the L lower leg, patient using lidex cream twice daily and wearing compression stockings daily. Does notice the swelling has gone down some.     Review of Systems   Constitutional: Negative for fever, chills and fatigue.   HENT: Negative for nosebleeds, congestion and sore throat.    Respiratory: Negative for cough and shortness of breath.    Gastrointestinal: Negative for vomiting, diarrhea and constipation.   Musculoskeletal: Negative for joint swelling and arthralgias.   Skin: Positive for activity-related sunscreen use and wears hat. Negative for daily sunscreen use.   Hematologic/Lymphatic: Does not bruise/bleed easily.        Objective:    Physical Exam   Constitutional: He appears well-developed and well-nourished. No distress.   Eyes: No conjunctival no injection.   Cardiovascular: There is no local extremity swelling and no dependent edema.     Neurological: He is alert and oriented to person, place, and time. He is not disoriented.   Psychiatric: He has a normal mood and affect.   Skin:   Areas Examined (abnormalities noted in diagram):   Scalp / Hair Palpated and Inspected  Head / Face Inspection Performed  Neck Inspection Performed  Chest / Axilla Inspection Performed  Abdomen  Inspection Performed  Back Inspection Performed  RUE Inspected  LUE Inspection Performed  RLE Inspected  LLE Inspection Performed  Nails and Digits Inspection Performed                   Diagram Legend     Erythematous scaling macule/papule c/w actinic keratosis       Vascular papule c/w angioma      Pigmented verrucoid papule/plaque c/w seborrheic keratosis      Yellow umbilicated papule c/w sebaceous hyperplasia      Irregularly shaped tan macule c/w lentigo     1-2 mm smooth white papules consistent with Milia      Movable subcutaneous cyst with punctum c/w epidermal inclusion cyst      Subcutaneous movable cyst c/w pilar cyst      Firm pink to brown papule c/w dermatofibroma      Pedunculated fleshy papule(s) c/w skin tag(s)      Evenly pigmented macule c/w junctional nevus     Mildly variegated pigmented, slightly irregular-bordered macule c/w mildly atypical nevus      Flesh colored to evenly pigmented papule c/w intradermal nevus       Pink pearly papule/plaque c/w basal cell carcinoma      Erythematous hyperkeratotic cursted plaque c/w SCC      Surgical scar with no sign of skin cancer recurrence      Open and closed comedones      Inflammatory papules and pustules      Verrucoid papule consistent consistent with wart     Erythematous eczematous patches and plaques     Dystrophic onycholytic nail with subungual debris c/w onychomycosis     Umbilicated papule    Erythematous-base heme-crusted tan verrucoid plaque consistent with inflamed seborrheic keratosis     Erythematous Silvery Scaling Plaque c/w Psoriasis     See annotation              Assessment / Plan:      Pathology Orders:     Normal Orders This Visit    Tissue Specimen To Pathology, Dermatology     Questions:    Directional Terms:  Other(comment)    Clinical Information:  atyp mole    Specific Site:  l mid back    Tissue Specimen To Pathology, Dermatology     Questions:    Directional Terms:  Other(comment)    Clinical Information:  atyp mole     Specific Site:  l flank        Neoplasm  -     Tissue Specimen To Pathology, Dermatology  Shave biopsy procedure note:    Shave biopsy performed after verbal consent including risk of infection, scar, recurrence, need for additional treatment of site. Area prepped with alcohol, anesthetized with approximately 1.0cc of 1% lidocaine with epinephrine. Lesional tissue shaved with razor blade. Hemostasis achieved with application of aluminum chloride followed by hyfrecation. No complications. Dressing applied. Wound care explained.      Edema, unspecified type  Condition is stable.  We will continue present management.  Discussed with patient need to wear compression stockings and to elevate legs regularly, especially with sitting.  Patient to consider elevating legs during sleep if no pain in the toes or numbness.  Patient can consider using ace wrap in lieu of stockings but has to watch out for bluing of the toes or pain or numbness in the toes.  Patient may need evaluation with their primary or cardiologist for leg swelling.    Venous stasis dermatitis, unspecified laterality  Condition is stable.  We will continue present management.  Lidex prn only now.  Reviewed with patient different treatment options and associated risks.    Venous stasis  Discussed with patient need to wear compression stockings and to elevate legs regularly, especially with sitting.  Patient to consider elevating legs during sleep if no pain in the toes or numbness.  Patient can consider using ace wrap in lieu of stockings but has to watch out for bluing of the toes or pain or numbness in the toes.  Patient may need evaluation with their primary or cardiologist for leg swelling.  Condition is stable.  We will continue present management.    Lipodermatosclerosis  No major signs of now.    Hyperpigmentation  Discussed with the patient the risk of color scars or hyperpigmentation that could take months to resolve.    Numerous moles  Discussed all  of the following with the patient.    Patient to check their breasts (if applicable), buttocks, and groin with a mirror.  Patient deferred our examination of these areas today.    Each month, check your body for any spots such as freckles, age spots, and moles.  Watch for color changes and shape changes and growth in size.    Moles, also called nevi, are small, colored (pigmented) marks on the skin. They have no known purpose. Many moles appear before age 30, but they also increase frequently as people age. Moles most often are not cancer (benign) and are harmless. But some become cancerous (malignant). Thats why you need to watch the moles on your body and tell your healthcare provider about any that concern you.    We will recheck the back at our follow up appointment.    Neoplasm of uncertain behavior  -     Tissue Specimen To Pathology, Dermatology  Shave biopsy procedure note:    Shave biopsy performed after verbal consent including risk of infection, scar, recurrence, need for additional treatment of site. Area prepped with alcohol, anesthetized with approximately 1.0cc of 1% lidocaine with epinephrine. Lesional tissue shaved with razor blade. Hemostasis achieved with application of aluminum chloride followed by hyfrecation. No complications. Dressing applied. Wound care explained.               Follow up in about 6 months (around 2/13/2020) for TBSE.

## 2019-08-19 PROBLEM — Z00.00 ANNUAL PHYSICAL EXAM: Status: RESOLVED | Noted: 2018-09-17 | Resolved: 2019-08-19

## 2019-08-21 ENCOUNTER — PATIENT MESSAGE (OUTPATIENT)
Dept: DERMATOLOGY | Facility: CLINIC | Age: 49
End: 2019-08-21

## 2019-11-04 ENCOUNTER — PATIENT MESSAGE (OUTPATIENT)
Dept: FAMILY MEDICINE | Facility: CLINIC | Age: 49
End: 2019-11-04

## 2019-11-04 DIAGNOSIS — G47.25 CIRCADIAN RHYTHM SLEEP DISORDER, JET LAG TYPE: Primary | ICD-10-CM

## 2019-11-04 RX ORDER — ZOLPIDEM TARTRATE 10 MG/1
10 TABLET ORAL NIGHTLY PRN
Qty: 5 TABLET | Refills: 0 | Status: SHIPPED | OUTPATIENT
Start: 2019-11-04 | End: 2019-11-19

## 2019-11-04 NOTE — TELEPHONE ENCOUNTER
Patient sent a message requesting small prescription for zolpidem for out of time zone travel.  Patient will get gentle acted will need to sleep.  Sent 5 prescriptions 10 mg zolpidem.  Do not mix with alprazolam or alcohol.

## 2019-11-19 ENCOUNTER — OFFICE VISIT (OUTPATIENT)
Dept: FAMILY MEDICINE | Facility: CLINIC | Age: 49
End: 2019-11-19
Payer: COMMERCIAL

## 2019-11-19 VITALS
DIASTOLIC BLOOD PRESSURE: 89 MMHG | WEIGHT: 306 LBS | HEIGHT: 74 IN | SYSTOLIC BLOOD PRESSURE: 132 MMHG | BODY MASS INDEX: 39.27 KG/M2 | HEART RATE: 72 BPM

## 2019-11-19 DIAGNOSIS — R74.8 ELEVATED ALKALINE PHOSPHATASE LEVEL: ICD-10-CM

## 2019-11-19 DIAGNOSIS — E78.2 MIXED DYSLIPIDEMIA: Primary | ICD-10-CM

## 2019-11-19 DIAGNOSIS — F41.9 ANXIETY: ICD-10-CM

## 2019-11-19 PROCEDURE — 3008F PR BODY MASS INDEX (BMI) DOCUMENTED: ICD-10-PCS | Mod: S$GLB,,, | Performed by: INTERNAL MEDICINE

## 2019-11-19 PROCEDURE — 3008F BODY MASS INDEX DOCD: CPT | Mod: S$GLB,,, | Performed by: INTERNAL MEDICINE

## 2019-11-19 PROCEDURE — 99213 PR OFFICE/OUTPT VISIT, EST, LEVL III, 20-29 MIN: ICD-10-PCS | Mod: S$GLB,,, | Performed by: INTERNAL MEDICINE

## 2019-11-19 PROCEDURE — 99213 OFFICE O/P EST LOW 20 MIN: CPT | Mod: S$GLB,,, | Performed by: INTERNAL MEDICINE

## 2019-11-19 NOTE — PATIENT INSTRUCTIONS
"  Controlling Your Cholesterol  Cholesterol is a waxy substance. It travels in your blood through the blood vessels. When you have high cholesterol, it builds up in the walls of the blood vessels. This makes the vessels narrower. Blood flow decreases. You are then at greater risk for having a heart attack or a stroke.  Good and bad cholesterol  Lipids are fats. Blood is mostly water. Fat and water don't mix. So our bodies need lipoproteins (lipids inside a protein shell) to carry the lipids. The protein shell carries its lipids through the bloodstream. There are two main kinds of lipoproteins:  · LDL (low-density lipoprotein) is known as "bad cholesterol." It mainly carries cholesterol. It delivers this cholesterol to body cells. Excess LDL cholesterol will build up in artery walls. This increases your risk for heart disease and stroke.  · HDL (high-density lipoprotein) is known as "good cholesterol." This protein shell collects excess cholesterol that LDLs have left behind on blood vessel walls. That's why high levels of HDL cholesterol can decrease your risk of heart disease and stroke.  Controlling cholesterol levels  Total cholesterol includes LDL and HDL cholesterol, as well as other fats in the bloodstream. If your total cholesterol is high, follow the steps below to help lower your total cholesterol level:  · Eat less unhealthy fat:  ¨ Cut back on saturated fats and trans (also called hydrogenated) fats by selecting lean cuts of meat, low-fat dairy, and using oils instead of solid fats. Limit baked goods, processed meats, and fried foods. A diet thats high in these fats increases your bad cholesterol. It's not enough to just cut back on foods containing cholesterol.  ¨ Eat about 2 servings of fish per week. Most fish contain omega-3 fatty acids. These help lower blood cholesterol.  ¨ Eat more whole grains and soluble fiber (such as oat bran). These lower overall cholesterol.  · Be active:  ¨ Choose an " activity you enjoy. Walking, swimming, and riding a bike are some good ways to be active.  ¨ Start at a level where you feel comfortable. Increase your time and pace a little each week.  ¨ Work up to 40 minutes of moderate to high intensity physical activity at least 3 to 4 days per week.  ¨ Remember, some activity is better than none.  ¨ If you haven't been exercising regularly, start slowly. Check with your doctor to make sure the exercise plan is right for you.  · Quit smoking. Quitting smoking can improve your lipid levels. It also lowers your risk for heart disease and stroke.  · Weight management. If you are overweight or obese, your health care provider will work with you to lose weight and lower your BMI (body mass index) to a normal or near-normal level. Making diet changes and increasing physical activity can help.  · Take medication as directed. Many people need medication to get their LDL levels to a safe level. Medication to lower cholesterol levels is effective and safe. (But taking medication is not a substitute for exercise or watching your diet!) Your doctor can tell you whether you might benefit from a cholesterol-lowering medication.  Date Last Reviewed: 5/11/2015  © 6676-7439 [employer]. [address] [place] [address], [place] [address]. All rights reserved. This information is not intended as a substitute for professional medical care. Always follow your healthcare professional's instructions.        Aerobic Exercise for a Healthy Heart  Exercise is a lot more than an energy booster and a stress reliever. It also strengthens your heart muscle, lowers your blood pressure and cholesterol, and burns calories. It can also improve your resting muscle tone, and your mood.     Remember, some activity is better than none.    Choose an aerobic activity  Choose an activity that makes your heart and lungs work harder than they do when you rest or walk normally. This aerobic exercise can improve the  way your heart and other muscles use oxygen. Make it fun by exercising with a friend and choosing an activity you enjoy. Here are some ideas:  · Walking  · Swimming  · Bicycling  · Stair climbing  · Dancing  · Jogging  · Gardening  Exercise regularly  If you havent been exercising regularly,  get your doctors OK first. Then start slowly.  Here are some tips:  · Begin exercising 3 times a week for 5 to 10 minutes at a time.  · When you feel comfortable, add a few minutes each session.  · Slowly build up to exercising 3 to 4 times each week. Each session should last for 40 minutes, on average, and involve moderate- to vigorous-intensity physical activity.  · If you have been given nitroglycerin, be sure to carry it when you exercise.  · If you get chest pain (angina) when youre exercising, stop what youre doing, take your nitroglycerin, and call your doctor.  Date Last Reviewed: 6/2/2016  © 0486-8250 [employer]. [address] [place] [address], [place] [address]. All rights reserved. This information is not intended as a substitute for professional medical care. Always follow your healthcare professional's instructions.

## 2019-11-19 NOTE — PROGRESS NOTES
Subjective:       Patient ID: Arun Ellis is a 49 y.o. male.    Chief Complaint: Hyperlipidemia and Anxiety    Mr. Arun Ellis is a 49-year-old  gentleman who comes for follow-up.    He does have hyperlipidemia and thus far he also had morbid obesity with BMI of 44.  He has started following a Keto Diet and has lost approximately 36-40 lb of weight.  He feels good overall.  His self confidence and overall sense of well being has improved.    Currently is not on any lipid medications but previously his lipid panel was somewhat on the elevated side.    He also has longstanding history of anxiety which was compounded by certain recent events including his a in fend son's illness and also some degree of job insecurity issues.  Currently both the issues are stable at this point.  He has not had any need to use alprazolam recently.  He is not currently on Lexapro either.    He recently had to have travel to scanned even countries in [place] and needed a small prescription for Ambien to help him overcome the issues of jet lag.      Past Medical History:   Diagnosis Date    Anxiety     Depression      Social History     Socioeconomic History    Marital status:      Spouse name: Nila Ellis     Number of children: 2    Years of education: Not on file    Highest education level: Not on file   Occupational History    Occupation:      Comment: [employer]   Social Needs    Financial resource strain: Not on file    Food insecurity:     Worry: Not on file     Inability: Not on file    Transportation needs:     Medical: Not on file     Non-medical: Not on file   Tobacco Use    Smoking status: Never Smoker    Smokeless tobacco: Never Used   Substance and Sexual Activity    Alcohol use: Yes     Alcohol/week: 1.0 - 2.0 standard drinks     Types: 1 - 2 Glasses of wine per week     Comment: occ and rarely monthly    Drug use: No    Sexual activity: Yes     Partners: Female  "  Lifestyle    Physical activity:     Days per week: Not on file     Minutes per session: Not on file    Stress: Not at all   Relationships    Social connections:     Talks on phone: Not on file     Gets together: Not on file     Attends Confucianist service: Not on file     Active member of club or organization: Not on file     Attends meetings of clubs or organizations: Not on file     Relationship status: Not on file   Other Topics Concern    Not on file   Social History Narrative    Not on file     Past Surgical History:   Procedure Laterality Date    APPENDECTOMY       Family History   Problem Relation Age of Onset    Heart disease Father         CABG, MI, Smoker    Diabetes Mother         Pre Diabetes       Review of Systems   Constitutional: Negative for activity change and unexpected weight change (loss of 38 lbs keto diet).        Significant obesity with BMI of 39.  Patient has lost weight by 36 lb after  following a Keto Diet.   HENT: Negative for congestion, hearing loss, rhinorrhea and trouble swallowing.    Eyes: Negative for discharge and visual disturbance.   Respiratory: Negative for chest tightness and wheezing.    Cardiovascular: Negative for chest pain and palpitations.        Hyperlipidemia   Gastrointestinal: Negative for blood in stool, constipation, diarrhea and vomiting.   Endocrine: Negative for polydipsia and polyuria.   Genitourinary: Negative for difficulty urinating, hematuria and urgency.   Musculoskeletal: Negative for arthralgias, joint swelling and neck pain.   Neurological: Negative for dizziness, seizures, weakness, light-headedness and headaches.   Psychiatric/Behavioral: Positive for sleep disturbance (Is transient sleep disturbance because of jet lag.). Negative for confusion and dysphoric mood.        History of anxiety which is stable at this point.         Objective:      Blood pressure 132/89, pulse 72, height 6' 2" (1.88 m), weight (!) 138.8 kg (306 lb). Body mass " index is 39.29 kg/m².  Physical Exam   Constitutional: He is oriented to person, place, and time. He appears well-developed. No distress.   Significant obesity with a BMI of 39   HENT:   Head: Normocephalic.   Mouth/Throat: No oropharyngeal exudate.   Eyes: EOM are normal. Right conjunctiva is not injected. Left conjunctiva is not injected. No scleral icterus.   Neck: Neck supple. No tracheal deviation present. No thyromegaly present.   Cardiovascular: Normal rate, regular rhythm and normal heart sounds. Exam reveals no friction rub.   No murmur heard.  Pulmonary/Chest: Effort normal and breath sounds normal. No stridor. No respiratory distress.   Abdominal: Soft. Bowel sounds are normal. He exhibits no distension. There is no tenderness.   Musculoskeletal: He exhibits no edema or deformity.   Lymphadenopathy:     He has no cervical adenopathy.     He has no axillary adenopathy. No inguinal adenopathy noted on the right or left side.   Neurological: He is alert and oriented to person, place, and time.   Skin: No pallor.   Psychiatric: His behavior is normal. Thought content normal.   Nursing note and vitals reviewed.        Assessment:       1. Mixed dyslipidemia    2. Elevated alkaline phosphatase level    3. Anxiety           No visits with results within 3 Month(s) from this visit.   Latest known visit with results is:   Office Visit on 06/11/2019   Component Date Value Ref Range Status    WBC 06/12/2019 6.8  3.8 - 10.8 Thousand/uL Final    RBC 06/12/2019 4.79  4.20 - 5.80 Million/uL Final    Hemoglobin 06/12/2019 13.5  13.2 - 17.1 g/dL Final    Hematocrit 06/12/2019 40.4  38.5 - 50.0 % Final    Mean Corpuscular Volume 06/12/2019 84.3  80.0 - 100.0 fL Final    Mean Corpuscular Hemoglobin 06/12/2019 28.2  27.0 - 33.0 pg Final    Mean Corpuscular Hemoglobin Conc 06/12/2019 33.4  32.0 - 36.0 g/dL Final    RDW 06/12/2019 13.1  11.0 - 15.0 % Final    Platelets 06/12/2019 302  140 - 400 Thousand/uL Final     MPV 06/12/2019 9.8  7.5 - 12.5 fL Final    Neutrophils Absolute 06/12/2019 4,400  1,500 - 7,800 cells/uL Final    Lymph # 06/12/2019 1,693  850 - 3,900 cells/uL Final    Mono # 06/12/2019 524  200 - 950 cells/uL Final    Eos # 06/12/2019 143  15 - 500 cells/uL Final    Baso # 06/12/2019 41  0 - 200 cells/uL Final    Neutrophils Relative 06/12/2019 64.7  % Final    Lymph% 06/12/2019 24.9  % Final    Mono% 06/12/2019 7.7  % Final    Eosinophil% 06/12/2019 2.1  % Final    Basophil% 06/12/2019 0.6  % Final    Glucose 06/12/2019 101* 65 - 99 mg/dL Final    BUN, Bld 06/12/2019 15  7 - 25 mg/dL Final    Creatinine 06/12/2019 0.72  0.70 - 1.33 mg/dL Final    eGFR if non African American 06/12/2019 108  > OR = 60 mL/min/1.73m2 Final    eGFR if African American 06/12/2019 125  > OR = 60 mL/min/1.73m2 Final    BUN/Creatinine Ratio 06/12/2019 NOT APPLICABLE  6 - 22 (calc) Final    Sodium 06/12/2019 139  135 - 146 mmol/L Final    Potassium 06/12/2019 4.2  3.5 - 5.3 mmol/L Final    Chloride 06/12/2019 102  98 - 110 mmol/L Final    CO2 06/12/2019 27  20 - 32 mmol/L Final    Calcium 06/12/2019 9.3  8.6 - 10.3 mg/dL Final    Total Protein 06/12/2019 7.4  6.1 - 8.1 g/dL Final    Albumin 06/12/2019 4.2  3.6 - 5.1 g/dL Final    Globulin, Total 06/12/2019 3.2  1.9 - 3.7 g/dL (calc) Final    Albumin/Globulin Ratio 06/12/2019 1.3  1.0 - 2.5 (calc) Final    Total Bilirubin 06/12/2019 0.5  0.2 - 1.2 mg/dL Final    Alkaline Phosphatase 06/12/2019 148* 40 - 115 U/L Final    AST 06/12/2019 12  10 - 35 U/L Final    ALT 06/12/2019 13  9 - 46 U/L Final    CRP 06/12/2019 26.5* <8.0 mg/L Final    Sed Rate 06/12/2019 19  < OR = 20 mm/h Final         Plan:           Mixed dyslipidemia  -     Lipid panel; Future; Expected date: 11/19/2019  -     Comprehensive metabolic panel; Future; Expected date: 11/19/2019    Elevated alkaline phosphatase level    Anxiety      Patient's weight loss is come and level especially after  following a Keto Diet.  Will check a lipid panel again and see where we stand.  I 4 want him that by following a Keto Diet which is mostly of fact based diet, there might be some increase in LDL and total cholesterol levels though we may see improvement in the triglyceride levels.    Check general chemistry because of elevated alkaline phosphatase in past.  Will see how it does.    Anxiety symptoms seem to be doing okay at this point and he has not needed any medications.    Jet leg symptoms have been reviewed and he benefitted from a temporary and brief use of Ambien.  Hopefully-term use might not be needed.    Follow-up in 6 months for preventive physical.      Current Outpatient Medications:     aspirin 325 MG tablet, Take 1 tablet (325 mg total) by mouth 2 (two) times daily. for 10 days, Disp: 20 tablet, Rfl: 0

## 2020-02-03 ENCOUNTER — OFFICE VISIT (OUTPATIENT)
Dept: DERMATOLOGY | Facility: CLINIC | Age: 50
End: 2020-02-03
Payer: COMMERCIAL

## 2020-02-03 VITALS — WEIGHT: 306 LBS | BODY MASS INDEX: 39.27 KG/M2 | HEIGHT: 74 IN

## 2020-02-03 DIAGNOSIS — L81.9 HYPERPIGMENTATION: ICD-10-CM

## 2020-02-03 DIAGNOSIS — R60.9 EDEMA, UNSPECIFIED TYPE: Primary | ICD-10-CM

## 2020-02-03 DIAGNOSIS — I87.8 VENOUS STASIS: ICD-10-CM

## 2020-02-03 DIAGNOSIS — I87.2 VENOUS STASIS DERMATITIS, UNSPECIFIED LATERALITY: ICD-10-CM

## 2020-02-03 DIAGNOSIS — D22.9 NUMEROUS MOLES: ICD-10-CM

## 2020-02-03 PROCEDURE — 3008F BODY MASS INDEX DOCD: CPT | Mod: CPTII,S$GLB,, | Performed by: DERMATOLOGY

## 2020-02-03 PROCEDURE — 99213 PR OFFICE/OUTPT VISIT, EST, LEVL III, 20-29 MIN: ICD-10-PCS | Mod: S$GLB,,, | Performed by: DERMATOLOGY

## 2020-02-03 PROCEDURE — 99999 PR PBB SHADOW E&M-EST. PATIENT-LVL III: CPT | Mod: PBBFAC,,, | Performed by: DERMATOLOGY

## 2020-02-03 PROCEDURE — 3008F PR BODY MASS INDEX (BMI) DOCUMENTED: ICD-10-PCS | Mod: CPTII,S$GLB,, | Performed by: DERMATOLOGY

## 2020-02-03 PROCEDURE — 99213 OFFICE O/P EST LOW 20 MIN: CPT | Mod: S$GLB,,, | Performed by: DERMATOLOGY

## 2020-02-03 PROCEDURE — 99999 PR PBB SHADOW E&M-EST. PATIENT-LVL III: ICD-10-PCS | Mod: PBBFAC,,, | Performed by: DERMATOLOGY

## 2020-02-03 NOTE — PROGRESS NOTES
Subjective:       Patient ID:  Arun Ellis is a 49 y.o. male who presents for   Chief Complaint   Patient presents with    Edema     HPI    Last o/v 8/13/2019  Edema, unspecified type  Condition is stable.  We will continue present management.  Discussed with patient need to wear compression stockings and to elevate legs regularly, especially with sitting.  Patient to consider elevating legs during sleep if no pain in the toes or numbness.  Patient can consider using ace wrap in lieu of stockings but has to watch out for bluing of the toes or pain or numbness in the toes.  Patient may need evaluation with their primary or cardiologist for leg swelling.     Venous stasis dermatitis, unspecified laterality  Condition is stable.  We will continue present management.  Lidex prn only now.  Reviewed with patient different treatment options and associated risks.     Venous stasis  Discussed with patient need to wear compression stockings and to elevate legs regularly, especially with sitting.  Patient to consider elevating legs during sleep if no pain in the toes or numbness.  Patient can consider using ace wrap in lieu of stockings but has to watch out for bluing of the toes or pain or numbness in the toes.  Patient may need evaluation with their primary or cardiologist for leg swelling.  Condition is stable.  We will continue present management.     Lipodermatosclerosis  No major signs of now.     Hyperpigmentation  Discussed with the patient the risk of color scars or hyperpigmentation that could take months to resolve.     Numerous moles  Discussed all of the following with the patient.     Patient to check their breasts (if applicable), buttocks, and groin with a mirror.  Patient deferred our examination of these areas today.     Each month, check your body for any spots such as freckles, age spots, and moles.  Watch for color changes and shape changes and growth in size.     Moles, also called nevi, are  small, colored (pigmented) marks on the skin. They have no known purpose. Many moles appear before age 30, but they also increase frequently as people age. Moles most often are not cancer (benign) and are harmless. But some become cancerous (malignant). Thats why you need to watch the moles on your body and tell your healthcare provider about any that concern you.     We will recheck the back at our follow up appointment    Skin, left midback, shave biopsy:  -LENTIGINOUS MELANOCYTIC NEVUS, IRRITATED  2. Skin, left flank, shave biopsy:  -JUNCTIONAL MELANOCYTIC NEVUS WITH MILD ARCHITECTURAL ATYPIA, IRRITATED  -LESION IS EXCISED IN THE PLANES OF SECTION EXAMINED      Patient presents today for edema follow up to the L lower leg, swelling has gone done.   DC lidex cream for now, just some discoloration     Review of Systems   Constitutional: Negative for fever, chills and fatigue.   HENT: Negative for nosebleeds, congestion and sore throat.    Respiratory: Negative for cough and shortness of breath.    Gastrointestinal: Negative for vomiting, diarrhea and constipation.   Musculoskeletal: Negative for joint swelling and arthralgias.   Skin: Positive for activity-related sunscreen use and wears hat. Negative for daily sunscreen use.   Hematologic/Lymphatic: Does not bruise/bleed easily.        Objective:    Physical Exam   Constitutional: He appears well-developed and well-nourished. No distress.   Eyes: No conjunctival no injection.   Cardiovascular: There is no local extremity swelling.     Neurological: He is alert and oriented to person, place, and time. He is not disoriented.   Psychiatric: He has a normal mood and affect.   Skin:   Areas Examined (abnormalities noted in diagram):   Head / Face Inspection Performed  Neck Inspection Performed  Chest / Axilla Inspection Performed  Abdomen Inspection Performed  Back Inspection Performed  RUE Inspected  LUE Inspection Performed              Diagram Legend      Erythematous scaling macule/papule c/w actinic keratosis       Vascular papule c/w angioma      Pigmented verrucoid papule/plaque c/w seborrheic keratosis      Yellow umbilicated papule c/w sebaceous hyperplasia      Irregularly shaped tan macule c/w lentigo     1-2 mm smooth white papules consistent with Milia      Movable subcutaneous cyst with punctum c/w epidermal inclusion cyst      Subcutaneous movable cyst c/w pilar cyst      Firm pink to brown papule c/w dermatofibroma      Pedunculated fleshy papule(s) c/w skin tag(s)      Evenly pigmented macule c/w junctional nevus     Mildly variegated pigmented, slightly irregular-bordered macule c/w mildly atypical nevus      Flesh colored to evenly pigmented papule c/w intradermal nevus       Pink pearly papule/plaque c/w basal cell carcinoma      Erythematous hyperkeratotic cursted plaque c/w SCC      Surgical scar with no sign of skin cancer recurrence      Open and closed comedones      Inflammatory papules and pustules      Verrucoid papule consistent consistent with wart     Erythematous eczematous patches and plaques     Dystrophic onycholytic nail with subungual debris c/w onychomycosis     Umbilicated papule    Erythematous-base heme-crusted tan verrucoid plaque consistent with inflamed seborrheic keratosis     Erythematous Silvery Scaling Plaque c/w Psoriasis     See annotation      Assessment / Plan:        Edema, unspecified type  Resolved.  Patient to contact us for earlier follow up if anything recurs.  Pt lost 50 pounds.  Patient to watch for recurrence or flares or worsening and to call the clinic for a follow up appointment for such.    Venous stasis dermatitis, unspecified laterality  Resolved.  Patient to contact us for earlier follow up if anything recurs.  Patient and or guardian to monitor this area/lesion or these areas/lesions for changes or worsening.  Patient and or guardian to contact us if any changes are noted for such.    Numerous  moles  Condition is stable.  We will continue present management.  Discussed all of the following with the patient.    Patient to check their breasts (if applicable), buttocks, and groin with a mirror.  Patient deferred our examination of these areas today.    Each month, check your body for any spots such as freckles, age spots, and moles.  Watch for color changes and shape changes and growth in size.    Moles, also called nevi, are small, colored (pigmented) marks on the skin. They have no known purpose. Many moles appear before age 30, but they also increase frequently as people age. Moles most often are not cancer (benign) and are harmless. But some become cancerous (malignant). Thats why you need to watch the moles on your body and tell your healthcare provider about any that concern you.    We will recheck the back at our follow up appointment.  Consider biopsy of any suspicious moles or lesions later.      Hyperpigmentation  Discussed with the patient the risk of color scars or hyperpigmentation that could take months to resolve.  Discussed with patient the etiology and pathogenesis of the disease or skin lesion(s) and possible treatments and aggravators.      Venous stasis  Condition is stable.  We will continue present management.  Patient and or guardian to monitor this area/lesion or these areas/lesions for changes or worsening.  Patient and or guardian to contact us if any changes are noted for such.             Follow up in about 6 months (around 8/3/2020).

## 2020-04-20 ENCOUNTER — PATIENT MESSAGE (OUTPATIENT)
Dept: DERMATOLOGY | Facility: CLINIC | Age: 50
End: 2020-04-20

## 2020-06-01 ENCOUNTER — PATIENT MESSAGE (OUTPATIENT)
Dept: DERMATOLOGY | Facility: CLINIC | Age: 50
End: 2020-06-01

## 2020-06-08 ENCOUNTER — OFFICE VISIT (OUTPATIENT)
Dept: DERMATOLOGY | Facility: CLINIC | Age: 50
End: 2020-06-08
Payer: COMMERCIAL

## 2020-06-08 DIAGNOSIS — L81.4 LENTIGO: Primary | ICD-10-CM

## 2020-06-08 DIAGNOSIS — I87.2 VENOUS STASIS DERMATITIS, UNSPECIFIED LATERALITY: ICD-10-CM

## 2020-06-08 DIAGNOSIS — D22.9 NUMEROUS MOLES: ICD-10-CM

## 2020-06-08 PROCEDURE — 99212 OFFICE O/P EST SF 10 MIN: CPT | Mod: 95,,, | Performed by: DERMATOLOGY

## 2020-06-08 PROCEDURE — 99212 PR OFFICE/OUTPT VISIT, EST, LEVL II, 10-19 MIN: ICD-10-PCS | Mod: 95,,, | Performed by: DERMATOLOGY

## 2020-06-08 NOTE — PROGRESS NOTES
The patient location is: home  The chief complaint leading to consultation is: spot    Visit type: audiovisual    Face to Face time with patient: 5 m  5 minutes of total time spent on the encounter, which includes face to face time and non-face to face time preparing to see the patient (eg, review of tests), Obtaining and/or reviewing separately obtained history, Documenting clinical information in the electronic or other health record, Independently interpreting results (not separately reported) and communicating results to the patient/family/caregiver, or Care coordination (not separately reported).         Each patient to whom he or she provides medical services by telemedicine is:  (1) informed of the relationship between the physician and patient and the respective role of any other health care provider with respect to management of the patient; and (2) notified that he or she may decline to receive medical services by telemedicine and may withdraw from such care at any time.    Notes:     Subjective:       Patient ID:  Arun Ellis is a 50 y.o. male who presents for No chief complaint on file.    Pt co new spot x 3 months.  No dryness.  No pain or bleeding.      Review of Systems   Constitutional: Negative for fever.   HENT: Negative for sore throat, rhinorrhea and headaches.    Respiratory: Negative for cough.    Musculoskeletal: Negative for myalgias and arthralgias.   Skin: Negative for dry skin.   Neurological: Negative for headaches.        Objective:    Physical Exam   Constitutional: He appears well-developed and well-nourished.   Eyes: No conjunctival no injection.   Neurological: He is alert and oriented to person, place, and time.   Psychiatric: He has a normal mood and affect.   Skin:   Areas Examined (abnormalities noted in diagram):   Head / Face Inspection Performed              Diagram Legend     Erythematous scaling macule/papule c/w actinic keratosis       Vascular papule c/w angioma       Pigmented verrucoid papule/plaque c/w seborrheic keratosis      Yellow umbilicated papule c/w sebaceous hyperplasia      Irregularly shaped tan macule c/w lentigo     1-2 mm smooth white papules consistent with Milia      Movable subcutaneous cyst with punctum c/w epidermal inclusion cyst      Subcutaneous movable cyst c/w pilar cyst      Firm pink to brown papule c/w dermatofibroma      Pedunculated fleshy papule(s) c/w skin tag(s)      Evenly pigmented macule c/w junctional nevus     Mildly variegated pigmented, slightly irregular-bordered macule c/w mildly atypical nevus      Flesh colored to evenly pigmented papule c/w intradermal nevus       Pink pearly papule/plaque c/w basal cell carcinoma      Erythematous hyperkeratotic cursted plaque c/w SCC      Surgical scar with no sign of skin cancer recurrence      Open and closed comedones      Inflammatory papules and pustules      Verrucoid papule consistent consistent with wart     Erythematous eczematous patches and plaques     Dystrophic onycholytic nail with subungual debris c/w onychomycosis     Umbilicated papule    Erythematous-base heme-crusted tan verrucoid plaque consistent with inflamed seborrheic keratosis     Erythematous Silvery Scaling Plaque c/w Psoriasis     See annotation      Assessment / Plan:        Lentigo  Discussed with patient the benign nature of these lesions and that no treatment is indicated.    Patient instructed in importance in daily sun protection. Sun avoidance and topical protection discussed.     Patient encouraged to wear hat for all outdoor exposure.     Also discussed sun protective clothing.    Venous stasis dermatitis, unspecified laterality  Condition is stable.  We will continue present management.  Patient and or guardian to monitor this area/lesion or these areas/lesions for changes or worsening.  Patient and or guardian to contact us if any changes are noted for such.    Numerous moles  Recommended a full body skin  check for moles and skin cancer screening to be done later.             Follow up for Regularly set appointment.

## 2020-10-22 ENCOUNTER — PATIENT MESSAGE (OUTPATIENT)
Dept: FAMILY MEDICINE | Facility: CLINIC | Age: 50
End: 2020-10-22

## 2020-10-22 DIAGNOSIS — Z20.822 EXPOSURE TO COVID-19 VIRUS: Primary | ICD-10-CM

## 2020-10-23 ENCOUNTER — LAB VISIT (OUTPATIENT)
Dept: PRIMARY CARE CLINIC | Facility: CLINIC | Age: 50
End: 2020-10-23
Payer: COMMERCIAL

## 2020-10-23 DIAGNOSIS — Z20.822 EXPOSURE TO COVID-19 VIRUS: ICD-10-CM

## 2020-10-23 PROCEDURE — U0003 INFECTIOUS AGENT DETECTION BY NUCLEIC ACID (DNA OR RNA); SEVERE ACUTE RESPIRATORY SYNDROME CORONAVIRUS 2 (SARS-COV-2) (CORONAVIRUS DISEASE [COVID-19]), AMPLIFIED PROBE TECHNIQUE, MAKING USE OF HIGH THROUGHPUT TECHNOLOGIES AS DESCRIBED BY CMS-2020-01-R: HCPCS

## 2020-10-24 LAB — SARS-COV-2 RNA RESP QL NAA+PROBE: NOT DETECTED

## 2021-01-04 ENCOUNTER — PATIENT MESSAGE (OUTPATIENT)
Dept: FAMILY MEDICINE | Facility: CLINIC | Age: 51
End: 2021-01-04

## 2021-02-22 ENCOUNTER — PATIENT MESSAGE (OUTPATIENT)
Dept: FAMILY MEDICINE | Facility: CLINIC | Age: 51
End: 2021-02-22

## 2021-08-05 ENCOUNTER — PATIENT MESSAGE (OUTPATIENT)
Dept: FAMILY MEDICINE | Facility: CLINIC | Age: 51
End: 2021-08-05

## 2021-08-05 DIAGNOSIS — Z20.822 EXPOSURE TO COVID-19 VIRUS: Primary | ICD-10-CM

## 2022-04-01 ENCOUNTER — PATIENT MESSAGE (OUTPATIENT)
Dept: PHARMACY | Facility: CLINIC | Age: 52
End: 2022-04-01
Payer: COMMERCIAL

## 2022-10-24 ENCOUNTER — HOSPITAL ENCOUNTER (EMERGENCY)
Facility: HOSPITAL | Age: 52
Discharge: HOME | End: 2022-10-24
Attending: EMERGENCY MEDICINE
Payer: COMMERCIAL

## 2022-10-24 VITALS
HEART RATE: 74 BPM | TEMPERATURE: 99 F | HEIGHT: 74 IN | RESPIRATION RATE: 16 BRPM | WEIGHT: 315 LBS | SYSTOLIC BLOOD PRESSURE: 183 MMHG | BODY MASS INDEX: 40.43 KG/M2 | OXYGEN SATURATION: 96 % | DIASTOLIC BLOOD PRESSURE: 96 MMHG

## 2022-10-24 DIAGNOSIS — I10 HYPERTENSION: ICD-10-CM

## 2022-10-24 LAB
ALBUMIN SERPL BCP-MCNC: 3.9 G/DL (ref 3.5–5.2)
ALP SERPL-CCNC: 151 U/L (ref 55–135)
ALT SERPL W/O P-5'-P-CCNC: 23 U/L (ref 10–44)
ANION GAP SERPL CALC-SCNC: 12 MMOL/L (ref 8–16)
AST SERPL-CCNC: 24 U/L (ref 10–40)
BASOPHILS # BLD AUTO: 0.05 K/UL (ref 0–0.2)
BASOPHILS NFR BLD: 0.5 % (ref 0–1.9)
BILIRUB SERPL-MCNC: 0.4 MG/DL (ref 0.1–1)
BNP SERPL-MCNC: 10 PG/ML (ref 0–99)
BUN SERPL-MCNC: 17 MG/DL (ref 6–20)
CALCIUM SERPL-MCNC: 9.8 MG/DL (ref 8.7–10.5)
CHLORIDE SERPL-SCNC: 101 MMOL/L (ref 95–110)
CO2 SERPL-SCNC: 24 MMOL/L (ref 23–29)
CREAT SERPL-MCNC: 0.8 MG/DL (ref 0.5–1.4)
DIFFERENTIAL METHOD: ABNORMAL
EOSINOPHIL # BLD AUTO: 0.1 K/UL (ref 0–0.5)
EOSINOPHIL NFR BLD: 0.8 % (ref 0–8)
ERYTHROCYTE [DISTWIDTH] IN BLOOD BY AUTOMATED COUNT: 13.2 % (ref 11.5–14.5)
EST. GFR  (NO RACE VARIABLE): >60 ML/MIN/1.73 M^2
GLUCOSE SERPL-MCNC: 117 MG/DL (ref 70–110)
HCT VFR BLD AUTO: 41.6 % (ref 40–54)
HCV AB SERPL QL IA: NORMAL
HGB BLD-MCNC: 14.2 G/DL (ref 14–18)
HIV 1+2 AB+HIV1 P24 AG SERPL QL IA: NORMAL
IMM GRANULOCYTES # BLD AUTO: 0.04 K/UL (ref 0–0.04)
IMM GRANULOCYTES NFR BLD AUTO: 0.4 % (ref 0–0.5)
LYMPHOCYTES # BLD AUTO: 1.8 K/UL (ref 1–4.8)
LYMPHOCYTES NFR BLD: 19.2 % (ref 18–48)
MCH RBC QN AUTO: 30.3 PG (ref 27–31)
MCHC RBC AUTO-ENTMCNC: 34.1 G/DL (ref 32–36)
MCV RBC AUTO: 89 FL (ref 82–98)
MONOCYTES # BLD AUTO: 0.6 K/UL (ref 0.3–1)
MONOCYTES NFR BLD: 6.3 % (ref 4–15)
NEUTROPHILS # BLD AUTO: 6.8 K/UL (ref 1.8–7.7)
NEUTROPHILS NFR BLD: 72.8 % (ref 38–73)
NRBC BLD-RTO: 0 /100 WBC
PLATELET # BLD AUTO: 291 K/UL (ref 150–450)
PMV BLD AUTO: 8.9 FL (ref 9.2–12.9)
POTASSIUM SERPL-SCNC: 4 MMOL/L (ref 3.5–5.1)
PROT SERPL-MCNC: 8.5 G/DL (ref 6–8.4)
RBC # BLD AUTO: 4.68 M/UL (ref 4.6–6.2)
SODIUM SERPL-SCNC: 137 MMOL/L (ref 136–145)
TROPONIN I SERPL DL<=0.01 NG/ML-MCNC: 0.01 NG/ML (ref 0–0.03)
WBC # BLD AUTO: 9.31 K/UL (ref 3.9–12.7)

## 2022-10-24 PROCEDURE — 85025 COMPLETE CBC W/AUTO DIFF WBC: CPT | Performed by: NURSE PRACTITIONER

## 2022-10-24 PROCEDURE — 83880 ASSAY OF NATRIURETIC PEPTIDE: CPT | Performed by: NURSE PRACTITIONER

## 2022-10-24 PROCEDURE — 99284 PR EMERGENCY DEPT VISIT,LEVEL IV: ICD-10-PCS | Mod: ,,, | Performed by: NURSE PRACTITIONER

## 2022-10-24 PROCEDURE — 93010 EKG 12-LEAD: ICD-10-PCS | Mod: ,,, | Performed by: INTERNAL MEDICINE

## 2022-10-24 PROCEDURE — 99285 EMERGENCY DEPT VISIT HI MDM: CPT | Mod: 25

## 2022-10-24 PROCEDURE — 99284 EMERGENCY DEPT VISIT MOD MDM: CPT | Mod: ,,, | Performed by: NURSE PRACTITIONER

## 2022-10-24 PROCEDURE — 93005 ELECTROCARDIOGRAM TRACING: CPT

## 2022-10-24 PROCEDURE — 80053 COMPREHEN METABOLIC PANEL: CPT | Performed by: NURSE PRACTITIONER

## 2022-10-24 PROCEDURE — 87389 HIV-1 AG W/HIV-1&-2 AB AG IA: CPT | Performed by: PHYSICIAN ASSISTANT

## 2022-10-24 PROCEDURE — 86803 HEPATITIS C AB TEST: CPT | Performed by: PHYSICIAN ASSISTANT

## 2022-10-24 PROCEDURE — 84484 ASSAY OF TROPONIN QUANT: CPT | Performed by: NURSE PRACTITIONER

## 2022-10-24 PROCEDURE — 25000003 PHARM REV CODE 250: Performed by: NURSE PRACTITIONER

## 2022-10-24 PROCEDURE — 93010 ELECTROCARDIOGRAM REPORT: CPT | Mod: ,,, | Performed by: INTERNAL MEDICINE

## 2022-10-24 RX ORDER — CLONIDINE HYDROCHLORIDE 0.1 MG/1
0.1 TABLET ORAL
Status: COMPLETED | OUTPATIENT
Start: 2022-10-24 | End: 2022-10-24

## 2022-10-24 RX ORDER — AMLODIPINE BESYLATE 5 MG/1
5 TABLET ORAL DAILY
Qty: 14 TABLET | Refills: 0 | Status: SHIPPED | OUTPATIENT
Start: 2022-10-24 | End: 2022-10-27 | Stop reason: SDUPTHER

## 2022-10-24 RX ADMIN — CLONIDINE HYDROCHLORIDE 0.1 MG: 0.1 TABLET ORAL at 04:10

## 2022-10-24 NOTE — FIRST PROVIDER EVALUATION
Emergency Department TeleTriage Encounter Note      CHIEF COMPLAINT    Chief Complaint   Patient presents with    Hypertension       VITAL SIGNS   Initial Vitals [10/24/22 1426]   BP Pulse Resp Temp SpO2   (!) 214/96 93 18 98.9 °F (37.2 °C) 100 %      MAP       --            ALLERGIES    Review of patient's allergies indicates:  No Known Allergies    PROVIDER TRIAGE NOTE  This is a teletriage evaluation of a 52 y.o. male presenting to the ED with c/o elevated BP at home (measured because he was having trouble sleeping-felt his heart beating) 200/90 . Was sent here by .  No symptoms. No meds at home has not seen PCP >1 yr    PE:. Non-toxic/well-appearing. No respiratory distress, speaks in full sentences without issue. No active emesis nor cough. Normal eye contact and mentation.     Plan: EKG. Further/augmented workup at discretion of examining provider.     All ED beds are full at present; patient notified of this status.  Patient seen and medically screened by JEANNIE via teletriage. Orders initiated at triage to expedite care.  Patient is stable and will be placed in an ED bed when available.  Care will be transferred to an alternate provider when patient has been placed in an Exam Room further exam, additional orders, and disposition.         ORDERS  Labs Reviewed   HIV 1 / 2 ANTIBODY   HEPATITIS C ANTIBODY       ED Orders (720h ago, onward)      Start Ordered     Status Ordering Provider    10/24/22 1430 10/24/22 1429  HIV 1/2 Ag/Ab (4th Gen)  STAT         Acknowledged DAVID BAY    10/24/22 1430 10/24/22 1429  Hepatitis C Antibody  STAT         Acknowledged DAVID BAY              Virtual Visit Note: The provider triage portion of this emergency department evaluation and documentation was performed via [employer], a HIPAA-compliant telemedicine application, in concert with a tele-presenter in the room. A face to face patient evaluation with one of my colleagues will occur once the patient is placed in  an emergency department room.      DISCLAIMER: This note was prepared with [employer] voice recognition transcription software. Garbled syntax, mangled pronouns, and other bizarre constructions may be attributed to that software system.

## 2022-10-24 NOTE — ED TRIAGE NOTES
"Patient arrived to the ED by personal vehicle with the complaint of head discomfort. BP was 210/110. Patient reported difficulty sleeping as " I could feel my heart beat in my head."  "

## 2022-10-24 NOTE — ED PROVIDER NOTES
Encounter Date: 10/24/2022       History     Chief Complaint   Patient presents with    Hypertension     Chief complaint:  Hypertension    History of present illness: Patient is a 52-year-old male who is in no apparent distress presents the emergency department complaining that his blood pressure at home was elevated at 2 10/108 last night when he had palpitations and tinnitus.  He reports this morning he only has a mild headache.  He denies leg swelling chest pain shortness of breath and all other symptoms.  He has never taken blood pressure medication or had a diagnosis of hypertension.    The history is provided by the patient. No  was used.   Review of patient's allergies indicates:  No Known Allergies  Past Medical History:   Diagnosis Date    Anxiety     Depression      Past Surgical History:   Procedure Laterality Date    APPENDECTOMY       Family History   Problem Relation Age of Onset    Heart disease Father         CABG, MI, Smoker    Diabetes Mother         Pre Diabetes     Social History     Tobacco Use    Smoking status: Never    Smokeless tobacco: Never   Substance Use Topics    Alcohol use: Yes     Alcohol/week: 1.0 - 2.0 standard drink     Types: 1 - 2 Glasses of wine per week     Comment: occ and rarely monthly    Drug use: No     Review of Systems   Constitutional:  Negative for appetite change, chills, diaphoresis, fatigue and fever.   HENT:  Negative for congestion, ear discharge, ear pain, postnasal drip, rhinorrhea, sinus pressure, sneezing, sore throat and voice change.    Eyes:  Negative for discharge, itching and visual disturbance.   Respiratory:  Negative for cough, shortness of breath and wheezing.    Cardiovascular:  Negative for chest pain, palpitations and leg swelling.   Gastrointestinal:  Negative for abdominal pain, nausea and vomiting.   Endocrine: Negative for polydipsia, polyphagia and polyuria.   Genitourinary:  Negative for difficulty urinating, dysuria,  frequency, hematuria, penile discharge, penile pain, penile swelling and urgency.   Musculoskeletal:  Negative for arthralgias and myalgias.   Skin:  Negative for rash and wound.   Neurological:  Positive for headaches. Negative for dizziness, seizures, syncope and weakness.   Hematological:  Negative for adenopathy. Does not bruise/bleed easily.   Psychiatric/Behavioral:  Negative for agitation and self-injury. The patient is not nervous/anxious.      Physical Exam     Initial Vitals [10/24/22 1426]   BP Pulse Resp Temp SpO2   (!) 214/96 93 18 98.9 °F (37.2 °C) 100 %      MAP       --         Physical Exam    Nursing note and vitals reviewed.  Constitutional: He appears well-developed and well-nourished. He is not diaphoretic. No distress. He is not intubated.   HENT:   Head: Normocephalic and atraumatic.   Right Ear: External ear normal.   Left Ear: External ear normal.   Nose: Nose normal.   Eyes: Pupils are equal, round, and reactive to light. Right eye exhibits no discharge. Left eye exhibits no discharge. No scleral icterus.   Neck:   Normal range of motion.  Cardiovascular:  Regular rhythm, S1 normal, S2 normal and normal heart sounds.     Exam reveals no gallop.       No murmur heard.  Pulmonary/Chest: Effort normal and breath sounds normal. No accessory muscle usage. No apnea, no tachypnea and no bradypnea. He is not intubated. No respiratory distress. He has no decreased breath sounds. He has no wheezes. He has no rhonchi. He has no rales.   Abdominal: He exhibits no distension.   Musculoskeletal:         General: Normal range of motion.      Cervical back: Normal range of motion.     Neurological: He is alert and oriented to person, place, and time.   Skin: Skin is dry. Capillary refill takes less than 2 seconds.   Left leg 1+ pitting edema       ED Course   Procedures  Labs Reviewed   CBC W/ AUTO DIFFERENTIAL - Abnormal; Notable for the following components:       Result Value    MPV 8.9 (*)     All  other components within normal limits   COMPREHENSIVE METABOLIC PANEL - Abnormal; Notable for the following components:    Glucose 117 (*)     Total Protein 8.5 (*)     Alkaline Phosphatase 151 (*)     All other components within normal limits   HIV 1 / 2 ANTIBODY    Narrative:     Release to patient->Immediate   HEPATITIS C ANTIBODY    Narrative:     Release to patient->Immediate   TROPONIN I   B-TYPE NATRIURETIC PEPTIDE          Imaging Results              X-Ray Chest AP Portable (Final result)  Result time 10/24/22 17:14:27      Final result by Asher Tillman MD (10/24/22 17:14:27)                   Impression:      No radiographic acute intrathoracic process seen.      Electronically signed by: Asher Tillman MD  Date:    10/24/2022  Time:    17:14               Narrative:    EXAMINATION:  XR CHEST AP PORTABLE    CLINICAL HISTORY:  Essential (primary) hypertension    TECHNIQUE:  Single frontal view of the chest was performed.    COMPARISON:  None    FINDINGS:  Patient is slightly rotated.    Trachea is midline and patent.  Mild nonspecific elevation of the right hemidiaphragm.  The lungs are normal to slightly hypoexpanded with bibasilar minimal platelike scarring versus atelectasis, but otherwise clear.  No pleural effusion or pneumothorax.    Cardiomediastinal silhouette is midline and within normal limits.  Pulmonary vasculature and hilar contours are within normal limits.  No acute osseous process seen.  PA and lateral views can be obtained.                                       Medications   cloNIDine tablet 0.1 mg (0.1 mg Oral Given 10/24/22 5493)     Medical Decision Making:   Initial Assessment:   52-year-old male presents the emergency department complaining of mild headache.  He states that last night he had palpitations and tinnitus which will come from sleep causing him to check his blood pressure any found that it was elevated.  On examination today his blood pressure remains elevated.  He has no  "history of hypertension or cardiologic issues.  Breath sounds are clear to auscultation heart sounds without abnormality he does have some pitting edema in the left lower extremity less so on the right.  Differential Diagnosis:   Congestive heart failure, ACS MI, DVT PE, new onset renal failure, hypertension  Clinical Tests:   Lab Tests: Ordered and Reviewed  Radiological Study: Ordered and Reviewed  ED Management:  Patient was given Catapres 0.1 mg p.o. and labs were ordered.  Labs were essentially negative.  They are summarized below.  The patient should follow up with primary care as outlined, a prescription for amlodipine was provided 5 mg q.day 14. Refill 0.           ED Course as of 10/24/22 2102   Mon Oct 24, 2022   1616 BP(!): 200/100 [VC]   1616 Temp: 98.6 °F (37 °C) [VC]   1616 Temp src: Oral [VC]   1616 Pulse: 87 [VC]   1616 Resp: 18 [VC]   1616 SpO2: 96 % [VC]   1724 BNP: 10 [VC]   1724 Troponin I: 0.009 [VC]   1724 CBC auto differential(!)  Normal cbc. [VC]   1724 Comprehensive metabolic panel(!)  Normal cmp, elevated alk phos [VC]   1724 X-Ray Chest AP Portable  No radiographic acute intrathoracic process seen. [VC]   1728 BP(!): 224/95 [VC]   1728 Pulse: 84 [VC]   1728 Resp: 16 [VC]   1745 Hepatitis C Ab: Non-reactive [VC]   1748 HIV 1/2 Ag/Ab: Non-reactive [VC]      ED Course User Index  [VC] Christopher Enciso DNP             Vital signs at the time of disposition were:  BP (!) 183/96   Pulse 74   Temp 98.6 °F (37 °C) (Oral)   Resp 16   Ht 6' 2" (1.88 m)   Wt (!) 149.7 kg (330 lb)   SpO2 96%   BMI 42.37 kg/m²       See AVS for additional recommendations. Medications listed herein were prescribed after reviewing the patient's allergies, medication list, history, most recent laboratories as available.  Referrals below were provided after reviewing the patient's previous medical providers. He understands he  should return for any worsening or changes in condition.  Prior to discharge the " patient was asked if he  had any additional concerns or complaints and he declined. The patient was given an opportunity to ask questions and all were answered to his satisfaction.     Clinical Impression:   Final diagnoses:  [I10] Hypertension        ED Disposition Condition    Discharge           ED Prescriptions       Medication Sig Dispense Start Date End Date Auth. Provider    amLODIPine (NORVASC) 5 MG tablet Take 1 tablet (5 mg total) by mouth once daily. 14 tablet 10/24/2022 -- Christopher Enciso DNP          Follow-up Information       Follow up With Specialties Details Why Contact Info    Alphonse Parks MD Internal Medicine Schedule an appointment as soon as possible for a visit   [address]  [address]  [place] [place] [facility] [address]  [phone]               Christopher Enciso DNP  10/24/22 2107

## 2022-10-27 ENCOUNTER — PATIENT MESSAGE (OUTPATIENT)
Dept: FAMILY MEDICINE | Facility: CLINIC | Age: 52
End: 2022-10-27

## 2022-10-27 ENCOUNTER — OFFICE VISIT (OUTPATIENT)
Dept: FAMILY MEDICINE | Facility: CLINIC | Age: 52
End: 2022-10-27
Payer: COMMERCIAL

## 2022-10-27 VITALS
DIASTOLIC BLOOD PRESSURE: 82 MMHG | WEIGHT: 315 LBS | HEIGHT: 74 IN | SYSTOLIC BLOOD PRESSURE: 151 MMHG | HEART RATE: 75 BPM | BODY MASS INDEX: 40.43 KG/M2

## 2022-10-27 DIAGNOSIS — Z12.11 SCREENING FOR COLON CANCER: ICD-10-CM

## 2022-10-27 DIAGNOSIS — I10 ESSENTIAL HYPERTENSION: Primary | ICD-10-CM

## 2022-10-27 PROCEDURE — 3077F SYST BP >= 140 MM HG: CPT | Mod: CPTII,S$GLB,, | Performed by: INTERNAL MEDICINE

## 2022-10-27 PROCEDURE — 1159F MED LIST DOCD IN RCRD: CPT | Mod: CPTII,S$GLB,, | Performed by: INTERNAL MEDICINE

## 2022-10-27 PROCEDURE — 99213 PR OFFICE/OUTPT VISIT, EST, LEVL III, 20-29 MIN: ICD-10-PCS | Mod: S$GLB,,, | Performed by: INTERNAL MEDICINE

## 2022-10-27 PROCEDURE — 1160F PR REVIEW ALL MEDS BY PRESCRIBER/CLIN PHARMACIST DOCUMENTED: ICD-10-PCS | Mod: CPTII,S$GLB,, | Performed by: INTERNAL MEDICINE

## 2022-10-27 PROCEDURE — 3079F DIAST BP 80-89 MM HG: CPT | Mod: CPTII,S$GLB,, | Performed by: INTERNAL MEDICINE

## 2022-10-27 PROCEDURE — 3079F PR MOST RECENT DIASTOLIC BLOOD PRESSURE 80-89 MM HG: ICD-10-PCS | Mod: CPTII,S$GLB,, | Performed by: INTERNAL MEDICINE

## 2022-10-27 PROCEDURE — 99213 OFFICE O/P EST LOW 20 MIN: CPT | Mod: S$GLB,,, | Performed by: INTERNAL MEDICINE

## 2022-10-27 PROCEDURE — 1160F RVW MEDS BY RX/DR IN RCRD: CPT | Mod: CPTII,S$GLB,, | Performed by: INTERNAL MEDICINE

## 2022-10-27 PROCEDURE — 3077F PR MOST RECENT SYSTOLIC BLOOD PRESSURE >= 140 MM HG: ICD-10-PCS | Mod: CPTII,S$GLB,, | Performed by: INTERNAL MEDICINE

## 2022-10-27 PROCEDURE — 1159F PR MEDICATION LIST DOCUMENTED IN MEDICAL RECORD: ICD-10-PCS | Mod: CPTII,S$GLB,, | Performed by: INTERNAL MEDICINE

## 2022-10-27 RX ORDER — AMLODIPINE BESYLATE 10 MG/1
10 TABLET ORAL NIGHTLY
Qty: 30 TABLET | Refills: 2 | Status: SHIPPED | OUTPATIENT
Start: 2022-10-27 | End: 2022-10-27 | Stop reason: SDUPTHER

## 2022-10-27 RX ORDER — AMLODIPINE BESYLATE 10 MG/1
10 TABLET ORAL NIGHTLY
Qty: 30 TABLET | Refills: 4 | Status: SHIPPED | OUTPATIENT
Start: 2022-10-27 | End: 2023-01-30 | Stop reason: SDUPTHER

## 2022-10-27 NOTE — PROGRESS NOTES
Subjective:       Patient ID: Arun Ellis is a 52 y.o. male.    Chief Complaint: Hypertension    Mr. Arun Ellis is a 52-year-old gentleman who comes for follow-up.  For the last several days or perhaps weeks he has been noticing that his blood pressures were elevated to the point that they started heading to 180s to 200s.  She had some headaches.  He was alarmed and went to the emergency room.  Workup in the emergency room was essentially unremarkable for any major cardiac event.  EKG had shown some tall T-waves suggestive of probably LVH.  Renal functions were good.  No acute cardiac enzyme elevation.      No underlying chest pains.      Probably some fatigue and some headaches.      As to what elevated his blood pressures remains unclear.  He is obese with BMI of 44.  Thus far no diagnosed sleep apnea.  He does not have any renal problems. nonsmoker.  No abnormal alcohol use.      He is not taking NSAIDs or blood pressure causing medications.      There might be some stress and life and family issues but nothing major.  Job seems to be stable at this point.      Years ago he was found to have borderline elevated blood pressure and he was advised to monitor his blood pressures at home which he has started doing only now.    Hypertension  This is a new problem. The current episode started in the past 7 days. The problem has been gradually improving since onset. The problem is uncontrolled. Associated symptoms include headaches. Pertinent negatives include no blurred vision, chest pain, neck pain, orthopnea, palpitations, peripheral edema or shortness of breath. There are no associated agents to hypertension. Risk factors for coronary artery disease include male gender, obesity, sedentary lifestyle and stress. Past treatments include calcium channel blockers. The current treatment provides mild improvement. Hypertensive end-organ damage includes left ventricular hypertrophy (As per EKG for needs to be  confirmed with echo.). There is no history of angina, kidney disease, CAD/MI, heart failure, PVD or retinopathy. Sleep apnea: not diagnosed thus far..     Past Medical History:   Diagnosis Date    Anxiety     Depression      Social History     Socioeconomic History    Marital status:      Spouse name: Nila Ellis     Number of children: 2   Occupational History    Occupation:      Comment: [employer]   Tobacco Use    Smoking status: Never    Smokeless tobacco: Never   Substance and Sexual Activity    Alcohol use: Yes     Alcohol/week: 1.0 - 2.0 standard drink     Types: 1 - 2 Glasses of wine per week     Comment: occ and rarely monthly    Drug use: No    Sexual activity: Yes     Partners: Female     Past Surgical History:   Procedure Laterality Date    APPENDECTOMY       Family History   Problem Relation Age of Onset    Heart disease Father         CABG, MI, Smoker    Diabetes Mother         Pre Diabetes       Review of Systems   Constitutional:  Negative for activity change, chills, fever and unexpected weight change (Recent gait 14 lb and overall gain 38 lb since 3 years.).        Significant obesity with BMI of 39.  Patient has lost weight by 36 lb after  following a Keto Diet.   HENT:  Negative for congestion, hearing loss, rhinorrhea and trouble swallowing.    Eyes:  Negative for blurred vision, discharge and visual disturbance.   Respiratory:  Negative for chest tightness, shortness of breath and wheezing. Apnea: possible sleep apnea and need to confirm.   Cardiovascular:  Negative for chest pain, palpitations, orthopnea and leg swelling.        Hyperlipidemia   Gastrointestinal:  Negative for abdominal distention, abdominal pain, blood in stool, constipation, diarrhea and vomiting.   Endocrine: Negative for polydipsia and polyuria.   Genitourinary:  Negative for difficulty urinating, hematuria and urgency.   Musculoskeletal:  Negative for arthralgias, joint swelling and neck pain.  "  Neurological:  Positive for headaches. Negative for dizziness, seizures, weakness and light-headedness.   Psychiatric/Behavioral:  Positive for behavioral problems and sleep disturbance (Is transient sleep disturbance because of jet lag.). Negative for confusion and dysphoric mood. The patient is not nervous/anxious and is not hyperactive.         History of anxiety which is stable at this point.       Objective:      Blood pressure (!) 151/82, pulse 75, height 6' 2" (1.88 m), weight (!) 156 kg (344 lb). Body mass index is 44.17 kg/m².  Physical Exam  Vitals and nursing note reviewed.   Constitutional:       General: He is not in acute distress.     Appearance: He is well-developed. He is obese.      Comments: Significant obesity with a BMI of 44.17   HENT:      Head: Normocephalic.      Mouth/Throat:      Pharynx: No oropharyngeal exudate.   Eyes:      General: No scleral icterus.     Conjunctiva/sclera:      Right eye: Right conjunctiva is not injected.      Left eye: Left conjunctiva is not injected.   Neck:      Thyroid: No thyromegaly.      Trachea: No tracheal deviation.   Cardiovascular:      Rate and Rhythm: Normal rate and regular rhythm.      Heart sounds: Normal heart sounds. No murmur heard.    No friction rub.   Pulmonary:      Effort: Pulmonary effort is normal. No respiratory distress.      Breath sounds: Normal breath sounds. No stridor.   Abdominal:      General: There is no distension.      Palpations: Abdomen is soft.      Tenderness: There is no abdominal tenderness.      Comments: BMI of 44.   Musculoskeletal:         General: No deformity.      Cervical back: Neck supple.      Right lower leg: Edema (trace) present.      Left lower leg: Edema (trace) present.   Lymphadenopathy:      Cervical: No cervical adenopathy.      Lower Body: No right inguinal adenopathy. No left inguinal adenopathy.   Skin:     Coloration: Skin is not pale.   Neurological:      Mental Status: He is alert and oriented " to person, place, and time. Mental status is at baseline.   Psychiatric:         Behavior: Behavior normal.         Thought Content: Thought content normal.         Assessment:       1. Essential hypertension           Admission on 10/24/2022, Discharged on 10/24/2022   Component Date Value Ref Range Status    HIV 1/2 Ag/Ab 10/24/2022 Non-reactive  Non-reactive Final    Hepatitis C Ab 10/24/2022 Non-reactive  Non-reactive Final    WBC 10/24/2022 9.31  3.90 - 12.70 K/uL Final    RBC 10/24/2022 4.68  4.60 - 6.20 M/uL Final    Hemoglobin 10/24/2022 14.2  14.0 - 18.0 g/dL Final    Hematocrit 10/24/2022 41.6  40.0 - 54.0 % Final    MCV 10/24/2022 89  82 - 98 fL Final    MCH 10/24/2022 30.3  27.0 - 31.0 pg Final    MCHC 10/24/2022 34.1  32.0 - 36.0 g/dL Final    RDW 10/24/2022 13.2  11.5 - 14.5 % Final    Platelets 10/24/2022 291  150 - 450 K/uL Final    MPV 10/24/2022 8.9 (L)  9.2 - 12.9 fL Final    Immature Granulocytes 10/24/2022 0.4  0.0 - 0.5 % Final    Gran # (ANC) 10/24/2022 6.8  1.8 - 7.7 K/uL Final    Immature Grans (Abs) 10/24/2022 0.04  0.00 - 0.04 K/uL Final    Lymph # 10/24/2022 1.8  1.0 - 4.8 K/uL Final    Mono # 10/24/2022 0.6  0.3 - 1.0 K/uL Final    Eos # 10/24/2022 0.1  0.0 - 0.5 K/uL Final    Baso # 10/24/2022 0.05  0.00 - 0.20 K/uL Final    nRBC 10/24/2022 0  0 /100 WBC Final    Gran % 10/24/2022 72.8  38.0 - 73.0 % Final    Lymph % 10/24/2022 19.2  18.0 - 48.0 % Final    Mono % 10/24/2022 6.3  4.0 - 15.0 % Final    Eosinophil % 10/24/2022 0.8  0.0 - 8.0 % Final    Basophil % 10/24/2022 0.5  0.0 - 1.9 % Final    Differential Method 10/24/2022 Automated   Final    Sodium 10/24/2022 137  136 - 145 mmol/L Final    Potassium 10/24/2022 4.0  3.5 - 5.1 mmol/L Final    Chloride 10/24/2022 101  95 - 110 mmol/L Final    CO2 10/24/2022 24  23 - 29 mmol/L Final    Glucose 10/24/2022 117 (H)  70 - 110 mg/dL Final    BUN 10/24/2022 17  6 - 20 mg/dL Final    Creatinine 10/24/2022 0.8  0.5 - 1.4 mg/dL Final     Calcium 10/24/2022 9.8  8.7 - 10.5 mg/dL Final    Total Protein 10/24/2022 8.5 (H)  6.0 - 8.4 g/dL Final    Albumin 10/24/2022 3.9  3.5 - 5.2 g/dL Final    Total Bilirubin 10/24/2022 0.4  0.1 - 1.0 mg/dL Final    Alkaline Phosphatase 10/24/2022 151 (H)  55 - 135 U/L Final    AST 10/24/2022 24  10 - 40 U/L Final    ALT 10/24/2022 23  10 - 44 U/L Final    Anion Gap 10/24/2022 12  8 - 16 mmol/L Final    eGFR 10/24/2022 >60.0  >60 mL/min/1.73 m^2 Final    Troponin I 10/24/2022 0.009  0.000 - 0.026 ng/mL Final    BNP 10/24/2022 10  0 - 99 pg/mL Final     Normal sinus rhythm -EKG done on 10/24/2022  Minimal voltage criteria for LVH, may be normal variant ( R in aVL )   Nonspecific ST and/or T wave abnormalities   Low anterior forces   Abnormal ECG   No previous ECGs available   Confirmed by Marc Muir MD [id number]) on 10/25/2022 8:51:50 AM     Referred By: KILEY    SELF           Confirmed By:Marc Muir MD      Specimen Collected: 10/24/22 15:49 Last Resulted: 10/25/22 08:51          Plan:           Essential hypertension  -     amLODIPine (NORVASC) 10 MG tablet; Take 1 tablet (10 mg total) by mouth nightly.  Dispense: 30 tablet; Refill: 2    Off late patient's blood pressures have been somewhat more elevated in 180s to 200s.  In fact he went to the emergency room and had some basic workup including EKG.      EKG had shown possibility of left ventricular hypertrophy.  Perhaps some minor nonspecific ST-T changes.  No other major issues.      Labs were okay.  Blood sugar was slightly elevated but it was nonfasting.      He has been initiated on amlodipine 5 mg per day.  Blood pressures are not high at this point.      He does not particularly state that he is under a lot of stress besides the usual rigmaroles of life.      He is nonsmoker and no alcohol use.      Thus far he has not been formally diagnosed with sleep apnea.  There is no reported significant snoring or apneic episodes but I have advised to let his  wife know to watch him as to his pattern of sleeping at night.  If he does have sleep apnea there might be some contribution from this to his high blood pressures.      He does watch his diet and tries to avoid excess salt.  From exercise point of view or weight management he has not had the best stellar results.  In past he had lost about 30-40 lb of weight following a Keto Diet.  He has regained most of that weight back again.    In past also his blood pressures were somewhat elevated but he did not follow-up on those with home blood pressure checks.      He is currently on amlodipine 5 mg and I will add to another amlodipine to make a total of 10 mg and I will add a small amount of diuretic with hydrochlorothiazide 12.5 mg.  Let us see how he does with this combination in the next couple of weeks.  Start walking and exercise.  I will set a challenge for him to lose 4 lb of weight in the next 1 month or 6 weeks.    He does tend to feel a little drowsy with amlodipine and I am okay if he takes it at nighttime.    Interim update in 2 weeks to 6 weeks time.  Follow-up in 3 months time.    Advised Mr. Ellis about age and season appropriate immunizations/ cancer screenings.  Also seasonal influenza vaccine, update on tetanus diphtheria vaccination every 10 years.      Patient has been advised to watch diet and exercise. Avoid fried and fatty food. Compliance to medications and follow up urged.        Current Outpatient Medications:     aspirin 325 MG tablet, Take 1 tablet (325 mg total) by mouth 2 (two) times daily. for 10 days, Disp: 20 tablet, Rfl: 0    amLODIPine (NORVASC) 10 MG tablet, Take 1 tablet (10 mg total) by mouth nightly., Disp: 30 tablet, Rfl: 2

## 2022-11-03 ENCOUNTER — PATIENT MESSAGE (OUTPATIENT)
Dept: FAMILY MEDICINE | Facility: CLINIC | Age: 52
End: 2022-11-03

## 2022-11-03 DIAGNOSIS — I10 ESSENTIAL HYPERTENSION: Primary | ICD-10-CM

## 2022-11-03 RX ORDER — OLMESARTAN MEDOXOMIL AND HYDROCHLOROTHIAZIDE 20/12.5 20; 12.5 MG/1; MG/1
1 TABLET ORAL DAILY
Qty: 90 TABLET | Refills: 1 | Status: SHIPPED | OUTPATIENT
Start: 2022-11-03 | End: 2023-01-30 | Stop reason: SDUPTHER

## 2022-11-03 NOTE — PROGRESS NOTES
Essential hypertension  -     olmesartan-hydrochlorothiazide (BENICAR HCT) 20-12.5 mg per tablet; Take 1 tablet by mouth once daily.  Dispense: 90 tablet; Refill: 1   Shameka Dias     I got your message on SMS and I am replying back on the patient portal.    Yes your blood pressures are somewhat elevated and at this point I would suggest the following regimen.      1.-I am adding olmesartan hydrochlorothiazide which is a combination of olmesartan and hydrochlorothiazide (water pill).  This is 20/12.5 mg to be taken in the morning.  Take it with some orange juice or perhaps happened banana.  The hydrochlorothiazide component tends to lower the potassium and orange juice and banana replenishes it.    2. -I will continue with amlodipine 10 mg at bedtime.    It is not unusual that about and 40-50% of patients are not very well controlled with 1 blood pressure medication and we have to add another med.  Addition of another medication does not mean that previous  medication is a failure and hence it  needs to be discontinued.     My average patients are usually on 2 or 3 medications he    I have sent a new prescription to a [facility] pharmacy at [facility] for 90 days.    I am taking a vacation for approximately 2 weeks but I will be periodically checking my emails or my portal messages.  SMS connection may be somewhat patchy since I will be in  [place].      Marley     SR

## 2022-11-14 ENCOUNTER — PATIENT MESSAGE (OUTPATIENT)
Dept: FAMILY MEDICINE | Facility: CLINIC | Age: 52
End: 2022-11-14

## 2022-11-14 LAB — NONINV COLON CA DNA+OCC BLD SCRN STL QL: NORMAL

## 2022-11-16 ENCOUNTER — PATIENT MESSAGE (OUTPATIENT)
Dept: FAMILY MEDICINE | Facility: CLINIC | Age: 52
End: 2022-11-16

## 2022-11-16 DIAGNOSIS — Z12.11 SCREENING FOR COLON CANCER: Primary | ICD-10-CM

## 2022-11-16 NOTE — TELEPHONE ENCOUNTER
Screening for colon cancer  -     Cologuard Screening (Multitarget Stool DNA); Future; Expected date: 11/16/2022   This test has been sent again because previous sample could not be processed.

## 2022-11-28 ENCOUNTER — PATIENT MESSAGE (OUTPATIENT)
Dept: FAMILY MEDICINE | Facility: CLINIC | Age: 52
End: 2022-11-28

## 2022-12-03 LAB — NONINV COLON CA DNA+OCC BLD SCRN STL QL: NEGATIVE

## 2023-01-24 ENCOUNTER — OFFICE VISIT (OUTPATIENT)
Dept: DERMATOLOGY | Facility: CLINIC | Age: 53
End: 2023-01-24
Payer: COMMERCIAL

## 2023-01-24 DIAGNOSIS — D22.9 BENIGN MOLE: ICD-10-CM

## 2023-01-24 DIAGNOSIS — L81.4 LENTIGO: ICD-10-CM

## 2023-01-24 DIAGNOSIS — L85.3 DRY SKIN: ICD-10-CM

## 2023-01-24 DIAGNOSIS — D22.9 MULTIPLE BENIGN NEVI: ICD-10-CM

## 2023-01-24 DIAGNOSIS — L30.4 INTERTRIGO: ICD-10-CM

## 2023-01-24 DIAGNOSIS — Z76.89 ENCOUNTER FOR SKIN CARE: ICD-10-CM

## 2023-01-24 DIAGNOSIS — Z12.83 SKIN EXAM, SCREENING FOR CANCER: Primary | ICD-10-CM

## 2023-01-24 DIAGNOSIS — L28.2 PRURIGO: ICD-10-CM

## 2023-01-24 PROCEDURE — 99214 OFFICE O/P EST MOD 30 MIN: CPT | Mod: S$GLB,,, | Performed by: DERMATOLOGY

## 2023-01-24 PROCEDURE — 99999 PR PBB SHADOW E&M-EST. PATIENT-LVL III: ICD-10-PCS | Mod: PBBFAC,,, | Performed by: DERMATOLOGY

## 2023-01-24 PROCEDURE — 99214 PR OFFICE/OUTPT VISIT, EST, LEVL IV, 30-39 MIN: ICD-10-PCS | Mod: S$GLB,,, | Performed by: DERMATOLOGY

## 2023-01-24 PROCEDURE — 1160F RVW MEDS BY RX/DR IN RCRD: CPT | Mod: CPTII,S$GLB,, | Performed by: DERMATOLOGY

## 2023-01-24 PROCEDURE — 1159F PR MEDICATION LIST DOCUMENTED IN MEDICAL RECORD: ICD-10-PCS | Mod: CPTII,S$GLB,, | Performed by: DERMATOLOGY

## 2023-01-24 PROCEDURE — 1159F MED LIST DOCD IN RCRD: CPT | Mod: CPTII,S$GLB,, | Performed by: DERMATOLOGY

## 2023-01-24 PROCEDURE — 1160F PR REVIEW ALL MEDS BY PRESCRIBER/CLIN PHARMACIST DOCUMENTED: ICD-10-PCS | Mod: CPTII,S$GLB,, | Performed by: DERMATOLOGY

## 2023-01-24 PROCEDURE — 99999 PR PBB SHADOW E&M-EST. PATIENT-LVL III: CPT | Mod: PBBFAC,,, | Performed by: DERMATOLOGY

## 2023-01-24 RX ORDER — AMMONIUM LACTATE 12 G/100G
CREAM TOPICAL
Qty: 140 G | Refills: 2 | Status: SHIPPED | OUTPATIENT
Start: 2023-01-24

## 2023-01-24 RX ORDER — KETOCONAZOLE 20 MG/G
CREAM TOPICAL 2 TIMES DAILY
Qty: 60 G | Refills: 2 | Status: SHIPPED | OUTPATIENT
Start: 2023-01-24

## 2023-01-24 NOTE — PATIENT INSTRUCTIONS
Good skin care regimen discussed including limiting to one bath or shower per day, using lukewarm water with mild soap and moisturization to skin once to twice daily.  Consider glycerin bar soap or Dove.  Consider organic coconut oil.

## 2023-01-24 NOTE — PROGRESS NOTES
Subjective:       Patient ID:  Arun Ellis is a 52 y.o. male who presents for   Chief Complaint   Patient presents with    Skin Check     Tbse      HPI    Review of Systems   Constitutional: Negative.    HENT: Negative.     Respiratory: Negative.     Skin:  Positive for rash and dry skin.      Objective:    Physical Exam   Constitutional: He appears well-developed and well-nourished. No distress.   Eyes: No conjunctival no injection.   Neurological: He is alert and oriented to person, place, and time. He is not disoriented.   Psychiatric: He has a normal mood and affect.   Skin:   Areas Examined (abnormalities noted in diagram):   Scalp / Hair Palpated and Inspected  Head / Face Inspection Performed  Neck Inspection Performed  Chest / Axilla Inspection Performed  Abdomen Inspection Performed  Genitals / Buttocks / Groin Inspection Performed  Back Inspection Performed  RUE Inspected  LUE Inspection Performed  RLE Inspected  LLE Inspection Performed  Nails and Digits Inspection Performed                     Diagram Legend     Erythematous scaling macule/papule c/w actinic keratosis       Vascular papule c/w angioma      Pigmented verrucoid papule/plaque c/w seborrheic keratosis      Yellow umbilicated papule c/w sebaceous hyperplasia      Irregularly shaped tan macule c/w lentigo     1-2 mm smooth white papules consistent with Milia      Movable subcutaneous cyst with punctum c/w epidermal inclusion cyst      Subcutaneous movable cyst c/w pilar cyst      Firm pink to brown papule c/w dermatofibroma      Pedunculated fleshy papule(s) c/w skin tag(s)      Evenly pigmented macule c/w junctional nevus     Mildly variegated pigmented, slightly irregular-bordered macule c/w mildly atypical nevus      Flesh colored to evenly pigmented papule c/w intradermal nevus       Pink pearly papule/plaque c/w basal cell carcinoma      Erythematous hyperkeratotic cursted plaque c/w SCC      Surgical scar with no sign of skin  cancer recurrence      Open and closed comedones      Inflammatory papules and pustules      Verrucoid papule consistent consistent with wart     Erythematous eczematous patches and plaques     Dystrophic onycholytic nail with subungual debris c/w onychomycosis     Umbilicated papule    Erythematous-base heme-crusted tan verrucoid plaque consistent with inflamed seborrheic keratosis     Erythematous Silvery Scaling Plaque c/w Psoriasis     See annotation      Assessment / Plan:        Skin exam, screening for cancer  No other seriously suspicious lesions noted for body areas examined today.  Patient to inform of us if they notice any dark or changing or suspicious spots in areas not examined today.  Follow up for routine monitoring recommended to patient.    Instructed patient to watch out for dark spots, bleeding spots, crusty spots, sores that break out repeatedly in the same spot.  These characteristics are risk factors for skin cancer, and patient is to notify us if they experience any of these symptoms.  Brochure given for patient education.    Multiple benign nevi  Discussed all of the following with the patient.    Patient to check their breasts (if applicable), buttocks, and groin with a mirror.  Patient deferred our examination of these areas today.    Each month, check your body for any spots such as freckles, age spots, and moles.  Watch for color changes and shape changes and growth in size.    Have your goodman or  pay attention to any dark color changes to moles of the scalp.    Moles, also called nevi, are small, colored (pigmented) marks on the skin. They have no known purpose. Many moles appear before age 30, but they also increase frequently as people age. Moles most often are not cancer (benign) and are harmless. But some become cancerous (malignant). Thats why you need to watch the moles on your body and tell your healthcare provider about any that concern you.  Brochure given for  patient education.    Dry skin  Good skin care regimen discussed including limiting to one bath or shower per day, using lukewarm water with mild soap and moisturization to skin once to twice daily.  Consider glycerin bar soap or Dove.  Consider organic coconut oil.    Intertrigo  -     ketoconazole (NIZORAL) 2 % cream; Apply topically 2 (two) times daily. Prn chest rash  Dispense: 60 g; Refill: 2  Shower sooner than later after exercise, exertion, or sweating.  Can do wash cloth wipes if more convenient.  Sweat can cause irritation and may exacerbate skin conditions.  Use corn starch as a drying powder.  Educated patient about keeping body folds free of sweat with routine wiping and regular cornstarch usage in addition to prescription therapy.  Reviewed with patient different treatment options and associated risks.  Proper application of medications and or care for affected area(s) and condition(s) reviewed.    Encounter for skin care  Good skin care regimen discussed including limiting to one bath or shower per day, using lukewarm water with mild soap and moisturization to skin once to twice daily.  Consider glycerin bar soap or Dove.  Consider organic coconut oil.    Prurigo  -     ammonium lactate 12 % Crea; Bid to thick knuckles  Dispense: 140 g; Refill: 2  Discussed with patient the importance of not picking at the skin due to risks of infection, non healing, and scarring.  Discussed habit substitution with gadgets, widgets, rosary beads, jewelry, keys, et cetera.  If chewing, chew on pen caps or gun or something else.    Benign mole  We will recheck the l back at our follow up appointment.  Skin biopsy offered today, but patient wishes to wait on this for now.  Plan on biopsy of the l back at a follow up appointment to assess an atypical mole, possible skin cancer, growth, or skin rash/eruption.    Lentigo  Discussed with patient the benign nature of these lesions and that no treatment is indicated.              Follow up in about 6 months (around 7/24/2023) for bx mole on back.

## 2023-01-30 ENCOUNTER — OFFICE VISIT (OUTPATIENT)
Dept: FAMILY MEDICINE | Facility: CLINIC | Age: 53
End: 2023-01-30
Payer: COMMERCIAL

## 2023-01-30 VITALS
WEIGHT: 315 LBS | DIASTOLIC BLOOD PRESSURE: 86 MMHG | HEART RATE: 72 BPM | SYSTOLIC BLOOD PRESSURE: 137 MMHG | HEIGHT: 74 IN | BODY MASS INDEX: 40.43 KG/M2

## 2023-01-30 DIAGNOSIS — I10 ESSENTIAL HYPERTENSION: Primary | ICD-10-CM

## 2023-01-30 DIAGNOSIS — R73.9 ELEVATED BLOOD SUGAR: ICD-10-CM

## 2023-01-30 PROCEDURE — 3079F PR MOST RECENT DIASTOLIC BLOOD PRESSURE 80-89 MM HG: ICD-10-PCS | Mod: CPTII,S$GLB,, | Performed by: INTERNAL MEDICINE

## 2023-01-30 PROCEDURE — 1159F MED LIST DOCD IN RCRD: CPT | Mod: CPTII,S$GLB,, | Performed by: INTERNAL MEDICINE

## 2023-01-30 PROCEDURE — 99213 PR OFFICE/OUTPT VISIT, EST, LEVL III, 20-29 MIN: ICD-10-PCS | Mod: S$GLB,,, | Performed by: INTERNAL MEDICINE

## 2023-01-30 PROCEDURE — 99213 OFFICE O/P EST LOW 20 MIN: CPT | Mod: S$GLB,,, | Performed by: INTERNAL MEDICINE

## 2023-01-30 PROCEDURE — 3075F PR MOST RECENT SYSTOLIC BLOOD PRESS GE 130-139MM HG: ICD-10-PCS | Mod: CPTII,S$GLB,, | Performed by: INTERNAL MEDICINE

## 2023-01-30 PROCEDURE — 3008F PR BODY MASS INDEX (BMI) DOCUMENTED: ICD-10-PCS | Mod: CPTII,S$GLB,, | Performed by: INTERNAL MEDICINE

## 2023-01-30 PROCEDURE — 1159F PR MEDICATION LIST DOCUMENTED IN MEDICAL RECORD: ICD-10-PCS | Mod: CPTII,S$GLB,, | Performed by: INTERNAL MEDICINE

## 2023-01-30 PROCEDURE — 3008F BODY MASS INDEX DOCD: CPT | Mod: CPTII,S$GLB,, | Performed by: INTERNAL MEDICINE

## 2023-01-30 PROCEDURE — 3079F DIAST BP 80-89 MM HG: CPT | Mod: CPTII,S$GLB,, | Performed by: INTERNAL MEDICINE

## 2023-01-30 PROCEDURE — 3075F SYST BP GE 130 - 139MM HG: CPT | Mod: CPTII,S$GLB,, | Performed by: INTERNAL MEDICINE

## 2023-01-30 PROCEDURE — 1160F PR REVIEW ALL MEDS BY PRESCRIBER/CLIN PHARMACIST DOCUMENTED: ICD-10-PCS | Mod: CPTII,S$GLB,, | Performed by: INTERNAL MEDICINE

## 2023-01-30 PROCEDURE — 1160F RVW MEDS BY RX/DR IN RCRD: CPT | Mod: CPTII,S$GLB,, | Performed by: INTERNAL MEDICINE

## 2023-01-30 RX ORDER — OLMESARTAN MEDOXOMIL AND HYDROCHLOROTHIAZIDE 20/12.5 20; 12.5 MG/1; MG/1
1 TABLET ORAL DAILY
Qty: 90 TABLET | Refills: 2 | Status: SHIPPED | OUTPATIENT
Start: 2023-01-30 | End: 2023-08-21 | Stop reason: SDUPTHER

## 2023-01-30 RX ORDER — AMLODIPINE BESYLATE 10 MG/1
10 TABLET ORAL NIGHTLY
Qty: 90 TABLET | Refills: 2 | Status: SHIPPED | OUTPATIENT
Start: 2023-01-30 | End: 2023-11-06 | Stop reason: SDUPTHER

## 2023-01-30 NOTE — PROGRESS NOTES
Subjective:       Patient ID: Arun Ellis is a 52 y.o. male.    Chief Complaint: Hypertension, Hyperglycemia, and Obesity    Mr. Dias is a 52-year-old gentleman who comes follow-up.  Overall he is doing great.      He is underlying hypertension for which he takes amlodipine and olmesartan hydrochlorothiazide combination.  Hypertension is for the last year or so.  been started on low carb diet incorporating mostly lean cuts of proteins and green and fiber.  Thus far he has lost about 20-21 lb of weight.  He feels better.    Blood pressure at home seem to be okay.      Recently also had seen dermatologist for some benign lesions.    One of the recent labs also had shown slightly elevated blood sugars.    Hypertension  This is a chronic problem. The current episode started more than 1 month ago. The problem has been gradually improving since onset. The problem is controlled. Pertinent negatives include no anxiety, blurred vision, chest pain, headaches, malaise/fatigue, neck pain, orthopnea, palpitations, peripheral edema, PND, shortness of breath or sweats. There are no associated agents to hypertension. Risk factors for coronary artery disease include family history, obesity and sedentary lifestyle. Past treatments include angiotensin blockers, diuretics and calcium channel blockers. The current treatment provides significant improvement. There are no compliance problems.  There is no history of hypercortisolism or pheochromocytoma.     Past Medical History:   Diagnosis Date    Anxiety     Depression      Social History     Socioeconomic History    Marital status:      Spouse name: Nila Ellis     Number of children: 2   Occupational History    Occupation:      Comment: [employer]   Tobacco Use    Smoking status: Never    Smokeless tobacco: Never   Substance and Sexual Activity    Alcohol use: Yes     Alcohol/week: 1.0 - 2.0 standard drink     Types: 1 - 2 Glasses of wine per week      "Comment: occ and rarely monthly    Drug use: No    Sexual activity: Yes     Partners: Female     Past Surgical History:   Procedure Laterality Date    APPENDECTOMY       Family History   Problem Relation Age of Onset    Heart disease Father         CABG, MI, Smoker    Diabetes Mother         Pre Diabetes       Review of Systems   Constitutional:  Negative for activity change, chills, fatigue, fever, malaise/fatigue and unexpected weight change (Lost 21 lb..).        Has lost about 23 lb of weight.  Low carb diet.  Overall improved energy levels.   HENT:  Negative for congestion, hearing loss, rhinorrhea and trouble swallowing.    Eyes:  Negative for blurred vision, pain, discharge and visual disturbance.   Respiratory:  Negative for cough, chest tightness, shortness of breath and wheezing. Apnea: possible sleep apnea and need to confirm.   Cardiovascular:  Negative for chest pain, palpitations, orthopnea, leg swelling and PND.        Hyperlipidemia   Gastrointestinal:  Negative for abdominal distention, abdominal pain, blood in stool, constipation, diarrhea and vomiting.   Endocrine: Negative for polydipsia and polyuria.   Genitourinary:  Negative for difficulty urinating, hematuria and urgency.   Musculoskeletal:  Negative for arthralgias, joint swelling and neck pain.   Skin:  Negative for color change and rash.   Neurological:  Negative for dizziness, seizures, weakness, light-headedness and headaches.   Psychiatric/Behavioral:  Positive for sleep disturbance (Is transient sleep disturbance because of jet lag.). Negative for behavioral problems, confusion and dysphoric mood. The patient is not nervous/anxious and is not hyperactive.         History of anxiety which is stable at this point.  Anxiety was mostly pronounced when his son was sick with a significant liver disease and needed transplant.  Over years this seems to have settled down.       Objective:      Blood pressure 137/86, pulse 72, height 6' 2" (1.88 " m), weight (!) 146.5 kg (323 lb). Body mass index is 41.47 kg/m².  Physical Exam  Vitals and nursing note reviewed.   Constitutional:       General: He is not in acute distress.     Appearance: He is well-developed. He is obese.      Comments: Significant obesity with a BMI of 41.47   HENT:      Head: Normocephalic.   Eyes:      General: No scleral icterus.     Conjunctiva/sclera:      Right eye: Right conjunctiva is not injected.      Left eye: Left conjunctiva is not injected.   Neck:      Thyroid: No thyromegaly.      Trachea: No tracheal deviation.   Cardiovascular:      Rate and Rhythm: Normal rate and regular rhythm.      Heart sounds: Normal heart sounds. No murmur heard.    No friction rub.   Pulmonary:      Effort: Pulmonary effort is normal. No respiratory distress.      Breath sounds: Normal breath sounds. No stridor.   Abdominal:      General: There is no distension.      Palpations: Abdomen is soft.      Tenderness: There is no abdominal tenderness.      Comments: Lost weight.   Musculoskeletal:         General: No deformity.      Cervical back: Neck supple.      Right lower leg: No edema (trace).      Left lower leg: No edema (trace).   Lymphadenopathy:      Cervical: No cervical adenopathy.      Lower Body: No right inguinal adenopathy. No left inguinal adenopathy.   Skin:     Coloration: Skin is not pale.      Findings: No lesion or rash.   Neurological:      Mental Status: He is alert. Mental status is at baseline.   Psychiatric:         Behavior: Behavior normal.         Thought Content: Thought content normal.         Assessment:       1. Essential hypertension    2. Elevated blood sugar           No visits with results within 3 Month(s) from this visit.   Latest known visit with results is:   Office Visit on 10/27/2022   Component Date Value Ref Range Status    Cologuard Result 11/09/2022 Sample Could Not Be Processed 7  Negative Final    Cologuard Result 11/28/2022 Negative  Negative Final          Plan:           Essential hypertension  -     olmesartan-hydrochlorothiazide (BENICAR HCT) 20-12.5 mg per tablet; Take 1 tablet by mouth once daily.  Dispense: 90 tablet; Refill: 2  -     amLODIPine (NORVASC) 10 MG tablet; Take 1 tablet (10 mg total) by mouth nightly.  Dispense: 90 tablet; Refill: 2  -     Lipid Panel; Future; Expected date: 05/15/2023  -     Microalbumin/Creatinine Ratio, Urine; Future; Expected date: 05/15/2023  -     Comprehensive Metabolic Panel; Future; Expected date: 05/15/2023    Elevated blood sugar  -     Hemoglobin A1C; Future; Expected date: 05/15/2023      Overall patient's blood pressures are doing okay.  At arrival his blood pressures were somewhat elevated but subsequently seem to settle down.  I do feel that his blood pressures at home when he is calm and more comfortable will be even better.      He has lost approximately 20 lb of weight and if the trajectory and projection is right, and if he loses another 10-15 lb of weight in the next 4-6 months when he comes back for follow-up I would expect even better readings at that point.  Congratulations and keep it up.      I would like to check a new panel of labs when he comes back for follow-up in 4 months time.      He is more or less updated on most of the preventive care issues.      I would encourage him to consider taking COVID vaccine also whenever feasible and possible.      My computer states that he should be now given or offered pneumonia vaccine.    His alkaline phosphatase levels have been on the higher side and the cause of this is unclear.  Will check labs again when he follows up and decide about it.      Current Outpatient Medications:     ammonium lactate 12 % Crea, apply twice a day to thick knuckles, Disp: 140 g, Rfl: 2    aspirin 325 MG tablet, Take 1 tablet (325 mg total) by mouth 2 (two) times daily. for 10 days, Disp: 20 tablet, Rfl: 0    ketoconazole (NIZORAL) 2 % cream, Apply topically 2 (two) times  daily. as needed for chest rash, Disp: 60 g, Rfl: 2    amLODIPine (NORVASC) 10 MG tablet, Take 1 tablet (10 mg total) by mouth nightly., Disp: 90 tablet, Rfl: 2    olmesartan-hydrochlorothiazide (BENICAR HCT) 20-12.5 mg per tablet, Take 1 tablet by mouth once daily., Disp: 90 tablet, Rfl: 2

## 2023-01-31 NOTE — PATIENT INSTRUCTIONS

## 2023-05-25 ENCOUNTER — PATIENT MESSAGE (OUTPATIENT)
Dept: FAMILY MEDICINE | Facility: CLINIC | Age: 53
End: 2023-05-25

## 2023-06-17 ENCOUNTER — HOSPITAL ENCOUNTER (EMERGENCY)
Facility: HOSPITAL | Age: 53
Discharge: HOME | End: 2023-06-18
Attending: EMERGENCY MEDICINE
Payer: COMMERCIAL

## 2023-06-17 DIAGNOSIS — R00.2 PALPITATIONS: Primary | ICD-10-CM

## 2023-06-17 DIAGNOSIS — R07.9 CHEST PAIN: ICD-10-CM

## 2023-06-17 LAB
BASOPHILS # BLD AUTO: 0.06 K/UL (ref 0–0.2)
BASOPHILS NFR BLD: 0.8 % (ref 0–1.9)
DIFFERENTIAL METHOD: ABNORMAL
EOSINOPHIL # BLD AUTO: 0.2 K/UL (ref 0–0.5)
EOSINOPHIL NFR BLD: 2.2 % (ref 0–8)
ERYTHROCYTE [DISTWIDTH] IN BLOOD BY AUTOMATED COUNT: 12.9 % (ref 11.5–14.5)
HCT VFR BLD AUTO: 38.1 % (ref 40–54)
HGB BLD-MCNC: 13.2 G/DL (ref 14–18)
IMM GRANULOCYTES # BLD AUTO: 0.02 K/UL (ref 0–0.04)
IMM GRANULOCYTES NFR BLD AUTO: 0.3 % (ref 0–0.5)
LYMPHOCYTES # BLD AUTO: 2.5 K/UL (ref 1–4.8)
LYMPHOCYTES NFR BLD: 33.8 % (ref 18–48)
MCH RBC QN AUTO: 31.1 PG (ref 27–31)
MCHC RBC AUTO-ENTMCNC: 34.6 G/DL (ref 32–36)
MCV RBC AUTO: 90 FL (ref 82–98)
MONOCYTES # BLD AUTO: 0.6 K/UL (ref 0.3–1)
MONOCYTES NFR BLD: 8.8 % (ref 4–15)
NEUTROPHILS # BLD AUTO: 3.9 K/UL (ref 1.8–7.7)
NEUTROPHILS NFR BLD: 54.1 % (ref 38–73)
NRBC BLD-RTO: 0 /100 WBC
PLATELET # BLD AUTO: 245 K/UL (ref 150–450)
PMV BLD AUTO: 9 FL (ref 9.2–12.9)
POCT GLUCOSE: 140 MG/DL (ref 70–110)
RBC # BLD AUTO: 4.24 M/UL (ref 4.6–6.2)
WBC # BLD AUTO: 7.27 K/UL (ref 3.9–12.7)

## 2023-06-17 PROCEDURE — 84484 ASSAY OF TROPONIN QUANT: CPT | Performed by: STUDENT IN AN ORGANIZED HEALTH CARE EDUCATION/TRAINING PROGRAM

## 2023-06-17 PROCEDURE — 93010 ELECTROCARDIOGRAM REPORT: CPT | Mod: ,,, | Performed by: INTERNAL MEDICINE

## 2023-06-17 PROCEDURE — 99285 EMERGENCY DEPT VISIT HI MDM: CPT | Mod: 25

## 2023-06-17 PROCEDURE — 83880 ASSAY OF NATRIURETIC PEPTIDE: CPT | Performed by: STUDENT IN AN ORGANIZED HEALTH CARE EDUCATION/TRAINING PROGRAM

## 2023-06-17 PROCEDURE — 93005 ELECTROCARDIOGRAM TRACING: CPT

## 2023-06-17 PROCEDURE — 99284 PR EMERGENCY DEPT VISIT,LEVEL IV: ICD-10-PCS | Mod: ,,, | Performed by: EMERGENCY MEDICINE

## 2023-06-17 PROCEDURE — 99284 EMERGENCY DEPT VISIT MOD MDM: CPT | Mod: ,,, | Performed by: EMERGENCY MEDICINE

## 2023-06-17 PROCEDURE — 84443 ASSAY THYROID STIM HORMONE: CPT | Performed by: STUDENT IN AN ORGANIZED HEALTH CARE EDUCATION/TRAINING PROGRAM

## 2023-06-17 PROCEDURE — 93010 EKG 12-LEAD: ICD-10-PCS | Mod: ,,, | Performed by: INTERNAL MEDICINE

## 2023-06-17 PROCEDURE — 82962 GLUCOSE BLOOD TEST: CPT

## 2023-06-17 PROCEDURE — 80053 COMPREHEN METABOLIC PANEL: CPT | Performed by: STUDENT IN AN ORGANIZED HEALTH CARE EDUCATION/TRAINING PROGRAM

## 2023-06-17 PROCEDURE — 85025 COMPLETE CBC W/AUTO DIFF WBC: CPT | Performed by: STUDENT IN AN ORGANIZED HEALTH CARE EDUCATION/TRAINING PROGRAM

## 2023-06-18 VITALS
DIASTOLIC BLOOD PRESSURE: 67 MMHG | RESPIRATION RATE: 18 BRPM | HEIGHT: 74 IN | BODY MASS INDEX: 40.43 KG/M2 | HEART RATE: 80 BPM | WEIGHT: 315 LBS | OXYGEN SATURATION: 95 % | TEMPERATURE: 98 F | SYSTOLIC BLOOD PRESSURE: 132 MMHG

## 2023-06-18 LAB
ALBUMIN SERPL BCP-MCNC: 3.4 G/DL (ref 3.5–5.2)
ALP SERPL-CCNC: 116 U/L (ref 55–135)
ALT SERPL W/O P-5'-P-CCNC: 23 U/L (ref 10–44)
ANION GAP SERPL CALC-SCNC: 17 MMOL/L (ref 8–16)
AST SERPL-CCNC: 28 U/L (ref 10–40)
BILIRUB SERPL-MCNC: 0.2 MG/DL (ref 0.1–1)
BNP SERPL-MCNC: <10 PG/ML (ref 0–99)
BUN SERPL-MCNC: 19 MG/DL (ref 6–20)
CALCIUM SERPL-MCNC: 9.3 MG/DL (ref 8.7–10.5)
CHLORIDE SERPL-SCNC: 103 MMOL/L (ref 95–110)
CO2 SERPL-SCNC: 21 MMOL/L (ref 23–29)
CREAT SERPL-MCNC: 0.8 MG/DL (ref 0.5–1.4)
EST. GFR  (NO RACE VARIABLE): >60 ML/MIN/1.73 M^2
GLUCOSE SERPL-MCNC: 122 MG/DL (ref 70–110)
POTASSIUM SERPL-SCNC: 4.3 MMOL/L (ref 3.5–5.1)
PROT SERPL-MCNC: 7.7 G/DL (ref 6–8.4)
SODIUM SERPL-SCNC: 141 MMOL/L (ref 136–145)
TROPONIN I SERPL DL<=0.01 NG/ML-MCNC: <0.006 NG/ML (ref 0–0.03)
TROPONIN I SERPL DL<=0.01 NG/ML-MCNC: <0.006 NG/ML (ref 0–0.03)
TSH SERPL DL<=0.005 MIU/L-ACNC: 1.27 UIU/ML (ref 0.4–4)

## 2023-06-18 PROCEDURE — 84484 ASSAY OF TROPONIN QUANT: CPT

## 2023-06-18 PROCEDURE — 25000003 PHARM REV CODE 250: Performed by: EMERGENCY MEDICINE

## 2023-06-18 PROCEDURE — 84484 ASSAY OF TROPONIN QUANT: CPT | Performed by: STUDENT IN AN ORGANIZED HEALTH CARE EDUCATION/TRAINING PROGRAM

## 2023-06-18 PROCEDURE — 96361 HYDRATE IV INFUSION ADD-ON: CPT

## 2023-06-18 PROCEDURE — 96360 HYDRATION IV INFUSION INIT: CPT

## 2023-06-18 RX ADMIN — SODIUM CHLORIDE 1000 ML: 9 INJECTION, SOLUTION INTRAVENOUS at 12:06

## 2023-06-18 NOTE — DISCHARGE INSTRUCTIONS
Your labs today are unremarkable. Please return to the ER if you have recurrence of symptoms, primarily with chest pain, shortness of breath, sensation of palpitations (sensation of irregular heart beat) or fevers (temperature of 100.4 or greater).

## 2023-06-18 NOTE — EKG INTERPRETATIONS - EMERGENCY DEPT.
Independently interpreted by MD:  Rate 102, NSR, no stemi, no ectopy, no hypertrophy, sinus tachycardia otherwise normal

## 2023-06-18 NOTE — ED PROVIDER NOTES
"Encounter Date: 6/17/2023       History     Chief Complaint   Patient presents with    Chest Pain     CP x1hour, pt states he feels "off" and that his heart is pounding out of his chest. -N/V. +HTN     Patient is a 53 year old M with a PMHx of hypertension and obesity presenting to the ED for complaints of disorientation. He was at rest, sitting watching TV when he had a sudden feeling of disorientation with dry lips and palpitations. Also had generalized weakness with tingling in his legs. No syncope. He reports that he felt like his "mind was just shutting off". During this time, he had a very brief, now-resolved episode of chest pressure/discomfort, though no pain. Also did feel short of breath very briefly. No similar episodes previously. No identifiable triggers.    Review of patient's allergies indicates:  No Known Allergies  Past Medical History:   Diagnosis Date    Anxiety     Depression     Essential (primary) hypertension      Past Surgical History:   Procedure Laterality Date    APPENDECTOMY       Family History   Problem Relation Age of Onset    Heart disease Father         CABG, MI, Smoker    Diabetes Mother         Pre Diabetes     Social History     Tobacco Use    Smoking status: Never    Smokeless tobacco: Never   Substance Use Topics    Alcohol use: Yes     Alcohol/week: 1.0 - 2.0 standard drink     Types: 1 - 2 Glasses of wine per week     Comment: occ and rarely monthly    Drug use: No     Physical Exam     Initial Vitals [06/17/23 2235]   BP Pulse Resp Temp SpO2   (!) 199/97 107 16 98.6 °F (37 °C) 97 %      MAP       --         Physical Exam    Nursing note and vitals reviewed.  Constitutional: He appears well-developed. He is not diaphoretic. He is Obese . No distress.   Well-appearing.  Speaking full sentences.  No acute distress.   HENT:   Head: Normocephalic and atraumatic.   Right Ear: External ear normal.   Left Ear: External ear normal.   Neck: Neck supple.   Cardiovascular:  Regular " rhythm, normal heart sounds and intact distal pulses.   Tachycardia present.         Pulmonary/Chest: Breath sounds normal. No respiratory distress. He has no wheezes. He has no rhonchi. He has no rales.   Breath sounds clear to auscultation bilaterally.   Abdominal: Abdomen is soft. He exhibits no distension. There is no abdominal tenderness. There is no rebound and no guarding.   Musculoskeletal:      Cervical back: Neck supple.     Neurological: He is alert and oriented to person, place, and time. GCS score is 15. GCS eye subscore is 4. GCS verbal subscore is 5. GCS motor subscore is 6.   Skin: Skin is warm. Capillary refill takes less than 2 seconds. No rash noted.   Psychiatric: He has a normal mood and affect.       ED Course   Procedures  Labs Reviewed   CBC W/ AUTO DIFFERENTIAL - Abnormal; Notable for the following components:       Result Value    RBC 4.24 (*)     Hemoglobin 13.2 (*)     Hematocrit 38.1 (*)     MCH 31.1 (*)     MPV 9.0 (*)     All other components within normal limits   COMPREHENSIVE METABOLIC PANEL - Abnormal; Notable for the following components:    CO2 21 (*)     Glucose 122 (*)     Albumin 3.4 (*)     Anion Gap 17 (*)     All other components within normal limits    Narrative:     Add on TSH per Dr. Meyer @ 23:37 pm to order # [id number]   POCT GLUCOSE - Abnormal; Notable for the following components:    POCT Glucose 140 (*)     All other components within normal limits   TROPONIN I    Narrative:     Add on TSH per Dr. Meyer @ 23:37 pm to order # [id number]   B-TYPE NATRIURETIC PEPTIDE    Narrative:     Add on TSH per Dr. Meyer @ 23:37 pm to order # [id number]   TSH   TSH    Narrative:     Add on TSH per Dr. Meyer @ 23:37 pm to order # [id number]   TROPONIN I   HIV 1 / 2 ANTIBODY   HEPATITIS C ANTIBODY   POCT TROPONIN   POCT TROPONIN     EKG Readings: (Independently Interpreted)   Initial Reading: No STEMI. Previous EKG: Compared with most recent EKG Rhythm: Sinus Tachycardia. Ectopy:  No Ectopy. Conduction: Normal.     Imaging Results              X-Ray Chest AP Portable (Final result)  Result time 06/17/23 23:40:21      Final result by George Martines MD (06/17/23 23:40:21)                   Impression:      As above.      Electronically signed by: George Martines MD  Date:    06/17/2023  Time:    23:40               Narrative:    EXAMINATION:  XR CHEST AP PORTABLE    CLINICAL HISTORY:  Chest pain, unspecified    TECHNIQUE:  Single frontal view of the chest was performed.    COMPARISON:  10/24/2022.    FINDINGS:  Underinflated lungs with hypoventilatory change.  Subsegmental atelectatic change left base.    Heart and lungs otherwise appear unchanged when allowing for differences in technique and positioning.                                    X-Rays:   Independently Interpreted Readings:   Chest X-Ray: Normal heart size.  No infiltrates.  No acute abnormalities.   Medications   sodium chloride 0.9% bolus 1,000 mL 1,000 mL (0 mLs Intravenous Stopped 6/18/23 0201)     Medical Decision Making:   Initial Assessment:   Emergent evaluation of palpitations, chest pressure and shortness of breath.  He is afebrile and hemodynamically stable.  Differential Diagnosis:   ACS, hypertensive emergency/noncardiogenic pulmonary edema, doubt CHF, doubt viral URI, doubt pneumonia  Clinical Tests:   Lab Tests: Ordered and Reviewed  Radiological Study: Ordered and Reviewed  Medical Tests: Ordered and Reviewed  ED Management:  Additionally considered PE given hx of prior phlebitis of lower extremity and tachycardia, however given his history of events today, do not have suspicion for thromboembolism. Presentation most consistent with anxiety, which patient also has history of. Tachycardia resolved with IVF. Mildly hypoxic, though normal ambulatory pulse ox. Troponin x 2 negative; no concern for ACS. Relayed results to patient.  Discussed strict ED return precautions with patient, and he expressed understanding.                         Clinical Impression:   Final diagnoses:  [R07.9] Chest pain  [R00.2] Palpitations (Primary)        ED Disposition Condition    Discharge Stable          ED Prescriptions    None       Follow-up Information    None          John Epperson MD  Resident  06/18/23 0792

## 2023-06-19 ENCOUNTER — PATIENT MESSAGE (OUTPATIENT)
Dept: FAMILY MEDICINE | Facility: CLINIC | Age: 53
End: 2023-06-19

## 2023-06-19 LAB
POC CARDIAC TROPONIN I: 0.01 NG/ML (ref 0–0.08)
SAMPLE: NORMAL

## 2023-06-29 NOTE — ED NOTES
"Arun Ellis, a 53 y.o. male presents to the ED w/ complaint of palpitations, light headed/ disoriented, tingling legs bilaterally and dry mouth appx 2 hours PTA. Hx anxiety >3 years ago. States recently began taking ozempic appx 1 mo ago, and took second dose of both daily htn medications PTA because he had a similar sensation when he was dx with htn.Oriented x4.  O2 @ 92% on RA while at rest. 3L NC placed O2 @ 96 %. Ambulatory O2 94-96% on RA. Wife a bedside. Reports taking 81mg ASA daily.     Triage note:  Chief Complaint   Patient presents with    Chest Pain     CP x1hour, pt states he feels "off" and that his heart is pounding out of his chest. -N/V. +HTN     Review of patient's allergies indicates:  No Known Allergies  Past Medical History:   Diagnosis Date    Anxiety     Depression       "
Ambulatory to exit. No needs observed or expressed. All discharge paperwork in hand upon exit. Exit with family without difficulty. AAOx4   
Md at bedside  
Troponin 0.010  
X-ray at beside  
Near syncope

## 2023-08-18 ENCOUNTER — PATIENT MESSAGE (OUTPATIENT)
Dept: FAMILY MEDICINE | Facility: CLINIC | Age: 53
End: 2023-08-18

## 2023-08-19 NOTE — PROGRESS NOTES
Subjective:       Patient ID: Arun Ellis is a 53 y.o. male.    Chief Complaint: Hypertension    Mr. Arun Ellis is a 53-year-old gentleman who comes for follow-up.  Underlying medical issues include the following:-    1.-hypertension currently on amlodipine 10 and olmesartan hydrochlorothiazide 12/12.5   2.-mild dyslipidemia currently not on any medications.    3.-obesity with BMI greater than 40 and currently on Ozempic program from another clinic and has lost anywhere between 30-40 lb.  No side effects experience.    Overall Arun is doing fairly well especially after the weight loss.  His blood pressures are doing fairly good.      No side effects experienced from injection Ozempic except for the expected loss of appetite.  No nausea vomiting.  No abdominal pain.      The last time we had checked his lipid panel officially in the epic system was in 2019.      He also had a lifeline screening recently which was completely clean for his carotids, peripheral arteries and AAA.  His sinus was rhythm on the screening.    Incidentally and recently he had with visit the emergency room for not feeling well.  Unclear as to what was happening but he felt tired.  He had some workup especially for potential cardiac conditions and it was negative and was discharged in a stable condition.  After that he is feeling okay.  It is possible that he might have been sleep deprived for the prior 2 days.      He continues with COVID precautions and had at least 5 vaccinations.  He is updated on the tetanus vaccination in 2018.    Hypertension  This is a chronic problem. The current episode started more than 1 month ago. The problem has been gradually improving since onset. The problem is controlled. Pertinent negatives include no anxiety, blurred vision, chest pain, headaches, malaise/fatigue, neck pain, orthopnea, palpitations, peripheral edema, PND, shortness of breath or sweats. There are no associated agents to  hypertension. Risk factors for coronary artery disease include family history, obesity and sedentary lifestyle. Past treatments include angiotensin blockers, diuretics and calcium channel blockers. The current treatment provides significant improvement. There are no compliance problems.  There is no history of hypercortisolism or pheochromocytoma.       Past Medical History:   Diagnosis Date    Anxiety     Depression     Essential (primary) hypertension      Social History     Socioeconomic History    Marital status:      Spouse name: Nila Ellis     Number of children: 2   Occupational History    Occupation:      Comment: [place] [employer]   Tobacco Use    Smoking status: Never    Smokeless tobacco: Never   Substance and Sexual Activity    Alcohol use: Yes     Alcohol/week: 1.0 - 2.0 standard drink of alcohol     Types: 1 - 2 Glasses of wine per week     Comment: occ and rarely monthly    Drug use: No    Sexual activity: Yes     Partners: Female     Social Determinants of Health     Financial Resource Strain: Low Risk  (8/21/2023)    Overall Financial Resource Strain (CARDIA)     Difficulty of Paying Living Expenses: Not very hard   Food Insecurity: No Food Insecurity (8/21/2023)    Hunger Vital Sign     Worried About Running Out of Food in the Last Year: Never true     Ran Out of Food in the Last Year: Never true   Transportation Needs: No Transportation Needs (8/21/2023)    PRAPARE - Transportation     Lack of Transportation (Medical): No     Lack of Transportation (Non-Medical): No   Physical Activity: Inactive (8/21/2023)    Exercise Vital Sign     Days of Exercise per Week: 0 days     Minutes of Exercise per Session: 0 min   Stress: Stress Concern Present (8/21/2023)    [employer] - Occupational Stress Questionnaire     Feeling of Stress : To some extent   Social Connections: Socially Integrated (8/21/2023)    Social Connection and Isolation Panel [NHANES]      Frequency of Communication with Friends and Family: More than three times a week     Frequency of Social Gatherings with Friends and Family: More than three times a week     Attends [employer] Services: 1 to 4 times per year     Active Member of Clubs or Organizations: Yes     Attends Club or Organization Meetings: 1 to 4 times per year     Marital Status:    Housing Stability: Low Risk  (8/21/2023)    Housing Stability Vital Sign     Unable to Pay for Housing in the Last Year: No     Number of Places Lived in the Last Year: 1     Unstable Housing in the Last Year: No     Past Surgical History:   Procedure Laterality Date    APPENDECTOMY       Family History   Problem Relation Age of Onset    Heart disease Father         CABG, MI, Smoker    Diabetes Mother         Pre Diabetes       Review of Systems   Constitutional:  Negative for activity change, chills, fatigue, fever, malaise/fatigue and unexpected weight change.        Overall lost about 30-40 lb of weight after being on injection Ozempic.   HENT:  Negative for congestion, hearing loss, rhinorrhea and trouble swallowing.    Eyes:  Negative for blurred vision, pain, discharge and visual disturbance.   Respiratory:  Negative for cough, chest tightness, shortness of breath and wheezing. Apnea: possible sleep apnea and need to confirm.   Cardiovascular:  Negative for chest pain, palpitations, orthopnea, leg swelling and PND.        Hyperlipidemia/hypertension.  Very well controlled.   Gastrointestinal:  Negative for abdominal distention, abdominal pain, blood in stool, constipation, diarrhea and vomiting.   Endocrine: Negative for polydipsia and polyuria.   Genitourinary:  Negative for difficulty urinating, hematuria and urgency.   Musculoskeletal:  Negative for arthralgias, joint swelling and neck pain.   Skin:  Negative for color change and rash.   Neurological:  Negative for dizziness, seizures, weakness, light-headedness and headaches.  "  Psychiatric/Behavioral:  Positive for sleep disturbance (Is transient sleep disturbance because of jet lag.). Negative for behavioral problems, confusion and dysphoric mood. The patient is not nervous/anxious and is not hyperactive.         History of anxiety which is stable at this point.  Anxiety was mostly pronounced when his son was sick with a significant liver disease and needed transplant.  Over years this seems to have settled down.         Objective:      Blood pressure 137/88, pulse 98, height 6' 2" (1.88 m), weight (!) 138.8 kg (306 lb). Body mass index is 39.29 kg/m².  Physical Exam  Vitals and nursing note reviewed.   Constitutional:       General: He is not in acute distress.     Appearance: He is well-developed. He is obese.      Comments: Significant obesity with a BMI of 39.29   HENT:      Head: Normocephalic.   Eyes:      General: No scleral icterus.     Conjunctiva/sclera:      Right eye: Right conjunctiva is not injected.      Left eye: Left conjunctiva is not injected.   Neck:      Thyroid: No thyromegaly.      Trachea: No tracheal deviation.   Cardiovascular:      Rate and Rhythm: Normal rate and regular rhythm.      Heart sounds: Normal heart sounds. No murmur heard.     No friction rub.   Pulmonary:      Effort: Pulmonary effort is normal. No respiratory distress.      Breath sounds: Normal breath sounds. No stridor.   Abdominal:      General: There is no distension.      Palpations: Abdomen is soft.      Tenderness: There is no abdominal tenderness.      Comments: Lost weight.   Musculoskeletal:         General: No deformity.      Cervical back: Neck supple.      Right lower leg: No edema (trace).      Left lower leg: No edema (trace).   Lymphadenopathy:      Cervical: No cervical adenopathy.      Lower Body: No right inguinal adenopathy. No left inguinal adenopathy.   Skin:     Coloration: Skin is not pale.      Findings: No lesion or rash.   Neurological:      Mental Status: He is alert. " Mental status is at baseline.   Psychiatric:         Behavior: Behavior normal.         Thought Content: Thought content normal.           Assessment:                 Admission on 06/17/2023, Discharged on 06/18/2023   Component Date Value Ref Range Status    WBC 06/17/2023 7.27  3.90 - 12.70 K/uL Final    RBC 06/17/2023 4.24 (L)  4.60 - 6.20 M/uL Final    Hemoglobin 06/17/2023 13.2 (L)  14.0 - 18.0 g/dL Final    Hematocrit 06/17/2023 38.1 (L)  40.0 - 54.0 % Final    MCV 06/17/2023 90  82 - 98 fL Final    MCH 06/17/2023 31.1 (H)  27.0 - 31.0 pg Final    MCHC 06/17/2023 34.6  32.0 - 36.0 g/dL Final    RDW 06/17/2023 12.9  11.5 - 14.5 % Final    Platelets 06/17/2023 245  150 - 450 K/uL Final    MPV 06/17/2023 9.0 (L)  9.2 - 12.9 fL Final    Immature Granulocytes 06/17/2023 0.3  0.0 - 0.5 % Final    Gran # (ANC) 06/17/2023 3.9  1.8 - 7.7 K/uL Final    Immature Grans (Abs) 06/17/2023 0.02  0.00 - 0.04 K/uL Final    Lymph # 06/17/2023 2.5  1.0 - 4.8 K/uL Final    Mono # 06/17/2023 0.6  0.3 - 1.0 K/uL Final    Eos # 06/17/2023 0.2  0.0 - 0.5 K/uL Final    Baso # 06/17/2023 0.06  0.00 - 0.20 K/uL Final    nRBC 06/17/2023 0  0 /100 WBC Final    Gran % 06/17/2023 54.1  38.0 - 73.0 % Final    Lymph % 06/17/2023 33.8  18.0 - 48.0 % Final    Mono % 06/17/2023 8.8  4.0 - 15.0 % Final    Eosinophil % 06/17/2023 2.2  0.0 - 8.0 % Final    Basophil % 06/17/2023 0.8  0.0 - 1.9 % Final    Differential Method 06/17/2023 Automated   Final    Sodium 06/17/2023 141  136 - 145 mmol/L Final    Potassium 06/17/2023 4.3  3.5 - 5.1 mmol/L Final    Chloride 06/17/2023 103  95 - 110 mmol/L Final    CO2 06/17/2023 21 (L)  23 - 29 mmol/L Final    Glucose 06/17/2023 122 (H)  70 - 110 mg/dL Final    BUN 06/17/2023 19  6 - 20 mg/dL Final    Creatinine 06/17/2023 0.8  0.5 - 1.4 mg/dL Final    Calcium 06/17/2023 9.3  8.7 - 10.5 mg/dL Final    Total Protein 06/17/2023 7.7  6.0 - 8.4 g/dL Final    Albumin 06/17/2023 3.4 (L)  3.5 - 5.2 g/dL Final    Total  Bilirubin 06/17/2023 0.2  0.1 - 1.0 mg/dL Final    Alkaline Phosphatase 06/17/2023 116  55 - 135 U/L Final    AST 06/17/2023 28  10 - 40 U/L Final    ALT 06/17/2023 23  10 - 44 U/L Final    Anion Gap 06/17/2023 17 (H)  8 - 16 mmol/L Final    eGFR 06/17/2023 >60.0  >60 mL/min/1.73 m^2 Final    Troponin I 06/17/2023 <0.006  0.000 - 0.026 ng/mL Final    BNP 06/17/2023 <10  0 - 99 pg/mL Final    POCT Glucose 06/17/2023 140 (H)  70 - 110 mg/dL Final    TSH 06/17/2023 1.267  0.400 - 4.000 uIU/mL Final    Troponin I 06/18/2023 <0.006  0.000 - 0.026 ng/mL Final    POC Cardiac Troponin I 06/18/2023 0.01  0.00 - 0.08 ng/mL Final    Sample 06/18/2023 VENOUS   Final         Plan:           Essential hypertension  -     Lipid Panel; Future; Expected date: 08/22/2023  -     Microalbumin/Creatinine Ratio, Urine; Future; Expected date: 08/22/2023  -     Comprehensive Metabolic Panel; Future; Expected date: 08/22/2023    Elevated blood sugar  -     Hemoglobin A1C; Future; Expected date: 08/22/2023      Overall Arun's blood pressures are excellent.  This is a test money to his weight loss.  He is on Ozempic program and I have advised him to inculcate solid and good eating habits and exercise habits for future.    Labs will be ordered and will be notified accordingly.    At this point he does not have any active cardiac complains to warrant a formal cardiac checkup but should he have any in future will consider that.      I am looking forward him to lose another 5 or 10 lb of weight as he apprise is me about the weight loss and his blood pressures at home.  The 1st medication to be chopped off would be amlodipine and subsequently we can consider cutting down on ominous art on hydrochlorothiazide to the plain olmesartan component.  His target weight is about 275 lb or so and will wait for the target to happen.      I would like to see him back in 6 months time for preventive physical or earlier based upon any new or interim  developments.    I have sent a prescription for olmesartan hydrochlorothiazide and we need to make a decision about amlodipine if we need to continue or knock it off.      Current Outpatient Medications:     amLODIPine (NORVASC) 10 MG tablet, Take 1 tablet (10 mg total) by mouth nightly., Disp: 90 tablet, Rfl: 2    ammonium lactate 12 % Crea, apply twice a day to thick knuckles, Disp: 140 g, Rfl: 2    aspirin 500 MG tablet, Take 81 mg by mouth., Disp: , Rfl:     chlorhexidine (PERIDEX) 0.12 % solution, Rinse in mouth with 0.5 oz twice daily., Disp: 473 mL, Rfl: 0    ketoconazole (NIZORAL) 2 % cream, Apply topically 2 (two) times daily. as needed for chest rash, Disp: 60 g, Rfl: 2    olmesartan-hydrochlorothiazide (BENICAR HCT) 20-12.5 mg per tablet, Take 1 tablet by mouth once daily., Disp: 90 tablet, Rfl: 2    semaglutide (OZEMPIC) 1 mg/dose (2 mg/1.5 mL) PnIj, , Disp: , Rfl:

## 2023-08-21 ENCOUNTER — OFFICE VISIT (OUTPATIENT)
Dept: FAMILY MEDICINE | Facility: CLINIC | Age: 53
End: 2023-08-21
Payer: COMMERCIAL

## 2023-08-21 VITALS
HEIGHT: 74 IN | BODY MASS INDEX: 39.27 KG/M2 | DIASTOLIC BLOOD PRESSURE: 77 MMHG | HEART RATE: 98 BPM | SYSTOLIC BLOOD PRESSURE: 117 MMHG | WEIGHT: 306 LBS

## 2023-08-21 DIAGNOSIS — R73.9 ELEVATED BLOOD SUGAR: ICD-10-CM

## 2023-08-21 DIAGNOSIS — I10 ESSENTIAL HYPERTENSION: Primary | ICD-10-CM

## 2023-08-21 PROCEDURE — 3078F PR MOST RECENT DIASTOLIC BLOOD PRESSURE < 80 MM HG: ICD-10-PCS | Mod: CPTII,S$GLB,, | Performed by: INTERNAL MEDICINE

## 2023-08-21 PROCEDURE — 3074F SYST BP LT 130 MM HG: CPT | Mod: CPTII,S$GLB,, | Performed by: INTERNAL MEDICINE

## 2023-08-21 PROCEDURE — 3074F PR MOST RECENT SYSTOLIC BLOOD PRESSURE < 130 MM HG: ICD-10-PCS | Mod: CPTII,S$GLB,, | Performed by: INTERNAL MEDICINE

## 2023-08-21 PROCEDURE — 1159F MED LIST DOCD IN RCRD: CPT | Mod: CPTII,S$GLB,, | Performed by: INTERNAL MEDICINE

## 2023-08-21 PROCEDURE — 1160F RVW MEDS BY RX/DR IN RCRD: CPT | Mod: CPTII,S$GLB,, | Performed by: INTERNAL MEDICINE

## 2023-08-21 PROCEDURE — 99213 OFFICE O/P EST LOW 20 MIN: CPT | Mod: S$GLB,,, | Performed by: INTERNAL MEDICINE

## 2023-08-21 PROCEDURE — 3078F DIAST BP <80 MM HG: CPT | Mod: CPTII,S$GLB,, | Performed by: INTERNAL MEDICINE

## 2023-08-21 PROCEDURE — 99213 PR OFFICE/OUTPT VISIT, EST, LEVL III, 20-29 MIN: ICD-10-PCS | Mod: S$GLB,,, | Performed by: INTERNAL MEDICINE

## 2023-08-21 PROCEDURE — 1159F PR MEDICATION LIST DOCUMENTED IN MEDICAL RECORD: ICD-10-PCS | Mod: CPTII,S$GLB,, | Performed by: INTERNAL MEDICINE

## 2023-08-21 PROCEDURE — 3008F BODY MASS INDEX DOCD: CPT | Mod: CPTII,S$GLB,, | Performed by: INTERNAL MEDICINE

## 2023-08-21 PROCEDURE — 1160F PR REVIEW ALL MEDS BY PRESCRIBER/CLIN PHARMACIST DOCUMENTED: ICD-10-PCS | Mod: CPTII,S$GLB,, | Performed by: INTERNAL MEDICINE

## 2023-08-21 PROCEDURE — 3008F PR BODY MASS INDEX (BMI) DOCUMENTED: ICD-10-PCS | Mod: CPTII,S$GLB,, | Performed by: INTERNAL MEDICINE

## 2023-08-21 RX ORDER — OLMESARTAN MEDOXOMIL AND HYDROCHLOROTHIAZIDE 20/12.5 20; 12.5 MG/1; MG/1
1 TABLET ORAL DAILY
Qty: 90 TABLET | Refills: 2 | Status: SHIPPED | OUTPATIENT
Start: 2023-08-21 | End: 2024-08-20

## 2023-11-06 DIAGNOSIS — I10 ESSENTIAL HYPERTENSION: ICD-10-CM

## 2023-11-06 RX ORDER — AMLODIPINE BESYLATE 10 MG/1
10 TABLET ORAL NIGHTLY
Qty: 90 TABLET | Refills: 2 | Status: SHIPPED | OUTPATIENT
Start: 2023-11-06

## 2023-12-05 ENCOUNTER — PATIENT MESSAGE (OUTPATIENT)
Dept: FAMILY MEDICINE | Facility: CLINIC | Age: 53
End: 2023-12-05

## 2024-03-11 ENCOUNTER — PATIENT MESSAGE (OUTPATIENT)
Dept: FAMILY MEDICINE | Facility: CLINIC | Age: 54
End: 2024-03-11
Payer: COMMERCIAL

## 2024-03-11 ENCOUNTER — TELEPHONE (OUTPATIENT)
Dept: FAMILY MEDICINE | Facility: CLINIC | Age: 54
End: 2024-03-11
Payer: COMMERCIAL

## 2024-03-11 DIAGNOSIS — I10 ESSENTIAL HYPERTENSION: Primary | ICD-10-CM

## 2024-03-11 DIAGNOSIS — R73.9 ELEVATED BLOOD SUGAR: ICD-10-CM

## 2024-03-11 NOTE — TELEPHONE ENCOUNTER
Labs  please sign pended     Essential hypertension  -     COMPREHENSIVE METABOLIC PANEL; Future; Expected date: 2024  -     Microalbumin/Creatinine Ratio, Urine; Future; Expected date: 2024  -     Lipid Panel; Future; Expected date: 2024    Elevated blood sugar  -     Hemoglobin A1C; Future; Expected date: 2024

## 2024-07-08 DIAGNOSIS — I10 ESSENTIAL HYPERTENSION: ICD-10-CM

## 2024-07-08 RX ORDER — OLMESARTAN MEDOXOMIL AND HYDROCHLOROTHIAZIDE 20/12.5 20; 12.5 MG/1; MG/1
1 TABLET ORAL DAILY
Qty: 90 TABLET | Refills: 0 | Status: SHIPPED | OUTPATIENT
Start: 2024-07-08 | End: 2025-07-08

## 2024-08-05 DIAGNOSIS — I10 ESSENTIAL HYPERTENSION: ICD-10-CM

## 2024-08-05 RX ORDER — AMLODIPINE BESYLATE 10 MG/1
10 TABLET ORAL NIGHTLY
Qty: 90 TABLET | Refills: 0 | Status: SHIPPED | OUTPATIENT
Start: 2024-08-05

## 2024-10-08 ENCOUNTER — TELEPHONE (OUTPATIENT)
Dept: DERMATOLOGY | Facility: CLINIC | Age: 54
End: 2024-10-08
Payer: COMMERCIAL

## 2024-10-08 NOTE — TELEPHONE ENCOUNTER
Isatu with pt. He'll give a call at a  later date to reschedule.  schedule is on hold after January.     Patria         ----- Message from Med Assistant Cabral sent at 10/7/2024  4:39 PM CDT -----  Regarding: FW: reschedule request    ----- Message -----  From: Emmanuel Sheppard  Sent: 10/7/2024  10:13 AM CDT  To: [facility] Staff  Subject: reschedule request                               PATIENT CALL    Pt called regarding annual skin check in January.  Mr Ellis needs to r/s due to a work conflict, no appts in [facility]. Please call back at [phone], he is available anytime after 01/20.

## 2024-10-17 DIAGNOSIS — I10 ESSENTIAL HYPERTENSION: ICD-10-CM

## 2024-10-17 RX ORDER — OLMESARTAN MEDOXOMIL AND HYDROCHLOROTHIAZIDE 20/12.5 20; 12.5 MG/1; MG/1
1 TABLET ORAL DAILY
Qty: 90 TABLET | Refills: 0 | OUTPATIENT
Start: 2024-10-17 | End: 2025-10-17

## 2024-11-12 ENCOUNTER — PATIENT MESSAGE (OUTPATIENT)
Dept: ADMINISTRATIVE | Facility: HOSPITAL | Age: 54
End: 2024-11-12
Payer: COMMERCIAL

## 2025-03-11 ENCOUNTER — PATIENT MESSAGE (OUTPATIENT)
Dept: FAMILY MEDICINE | Facility: CLINIC | Age: 55
End: 2025-03-11

## 2025-03-11 DIAGNOSIS — Z91.89 AT INCREASED RISK FOR EXPOSURE TO INFLUENZA VIRUS: Primary | ICD-10-CM

## 2025-03-12 RX ORDER — OSELTAMIVIR PHOSPHATE 75 MG/1
75 CAPSULE ORAL DAILY
Qty: 10 CAPSULE | Refills: 0 | Status: SHIPPED | OUTPATIENT
Start: 2025-03-12 | End: 2025-03-22

## 2025-03-12 NOTE — TELEPHONE ENCOUNTER
At increased risk for exposure to influenza virus  -     oseltamivir (TAMIFLU) 75 MG capsule; Take 1 capsule (75 mg total) by mouth once daily. for 10 days  Dispense: 10 capsule; Refill: 0      Prescription sent for 10 days to [facility].

## 2025-05-15 ENCOUNTER — PATIENT MESSAGE (OUTPATIENT)
Dept: FAMILY MEDICINE | Facility: CLINIC | Age: 55
End: 2025-05-15

## 2025-07-21 DIAGNOSIS — I10 ESSENTIAL HYPERTENSION: Primary | ICD-10-CM

## 2025-07-21 DIAGNOSIS — I10 ESSENTIAL HYPERTENSION: ICD-10-CM

## 2025-07-21 RX ORDER — AMLODIPINE BESYLATE 10 MG/1
10 TABLET ORAL NIGHTLY
Qty: 90 TABLET | Refills: 0 | Status: SHIPPED | OUTPATIENT
Start: 2025-07-21

## 2025-07-21 RX ORDER — OLMESARTAN MEDOXOMIL AND HYDROCHLOROTHIAZIDE 20/12.5 20; 12.5 MG/1; MG/1
1 TABLET ORAL DAILY
Qty: 90 TABLET | Refills: 0 | Status: SHIPPED | OUTPATIENT
Start: 2025-07-21 | End: 2026-07-21

## 2025-07-21 RX ORDER — OLMESARTAN MEDOXOMIL AND HYDROCHLOROTHIAZIDE 20/12.5 20; 12.5 MG/1; MG/1
1 TABLET ORAL DAILY
Qty: 90 TABLET | Refills: 0 | OUTPATIENT
Start: 2025-07-21 | End: 2026-07-21

## 2025-07-21 RX ORDER — AMLODIPINE BESYLATE 10 MG/1
10 TABLET ORAL NIGHTLY
Qty: 90 TABLET | Refills: 0 | OUTPATIENT
Start: 2025-07-21

## 2025-08-20 ENCOUNTER — PATIENT MESSAGE (OUTPATIENT)
Dept: ADMINISTRATIVE | Facility: HOSPITAL | Age: 55
End: 2025-08-20